# Patient Record
Sex: MALE | Race: WHITE | NOT HISPANIC OR LATINO | Employment: FULL TIME | URBAN - METROPOLITAN AREA
[De-identification: names, ages, dates, MRNs, and addresses within clinical notes are randomized per-mention and may not be internally consistent; named-entity substitution may affect disease eponyms.]

---

## 2017-03-09 DIAGNOSIS — M25.571 ACUTE RIGHT ANKLE PAIN: Primary | ICD-10-CM

## 2017-03-30 ENCOUNTER — HOSPITAL ENCOUNTER (OUTPATIENT)
Dept: RADIOLOGY | Facility: HOSPITAL | Age: 22
Discharge: HOME | End: 2017-03-30
Attending: ORTHOPAEDIC SURGERY
Payer: OTHER MISCELLANEOUS

## 2017-03-30 ENCOUNTER — OFFICE VISIT (OUTPATIENT)
Dept: ORTHOPEDICS | Facility: CLINIC | Age: 22
End: 2017-03-30
Payer: OTHER MISCELLANEOUS

## 2017-03-30 VITALS — BODY MASS INDEX: 25.76 KG/M2 | HEIGHT: 62 IN | WEIGHT: 140 LBS

## 2017-03-30 DIAGNOSIS — M25.571 ACUTE RIGHT ANKLE PAIN: ICD-10-CM

## 2017-03-30 DIAGNOSIS — M19.079 ARTHRITIS OF ANKLE: Primary | ICD-10-CM

## 2017-03-30 PROCEDURE — 99213 OFFICE O/P EST LOW 20 MIN: CPT | Mod: 25,S$GLB,, | Performed by: ORTHOPAEDIC SURGERY

## 2017-03-30 PROCEDURE — 99999 PR PBB SHADOW E&M-EST. PATIENT-LVL II: CPT | Mod: PBBFAC,,, | Performed by: ORTHOPAEDIC SURGERY

## 2017-03-30 PROCEDURE — 73610 X-RAY EXAM OF ANKLE: CPT | Mod: TC,PN,RT

## 2017-03-30 PROCEDURE — 73610 X-RAY EXAM OF ANKLE: CPT | Mod: 26,RT,, | Performed by: RADIOLOGY

## 2017-03-30 PROCEDURE — 20605 DRAIN/INJ JOINT/BURSA W/O US: CPT | Mod: RT,S$GLB,, | Performed by: ORTHOPAEDIC SURGERY

## 2017-03-30 RX ORDER — TRIAMCINOLONE ACETONIDE 40 MG/ML
40 INJECTION, SUSPENSION INTRA-ARTICULAR; INTRAMUSCULAR
Status: DISCONTINUED | OUTPATIENT
Start: 2017-03-30 | End: 2017-03-30 | Stop reason: HOSPADM

## 2017-03-30 RX ADMIN — TRIAMCINOLONE ACETONIDE 40 MG: 40 INJECTION, SUSPENSION INTRA-ARTICULAR; INTRAMUSCULAR at 05:03

## 2017-03-30 NOTE — PROGRESS NOTES
Subjective:      Patient ID: Kalin Sidhu IV is a 21 y.o. male who is here for f/u on his right ankle fracture. He was last injected in June. He rates his pain 3/10 today. He would like to repeat injection. THis will be his third injection. The last one lasted about 4 months.     Chief Complaint: Pain of the Right Ankle (Last injected 9-27-16 )    HPI  Social History     Occupational History    Not on file.     Social History Main Topics    Smoking status: Never Smoker    Smokeless tobacco: Current User      Comment: Dip tabacco    Alcohol use No      Comment: rarely    Drug use: No    Sexual activity: Not on file      ROS    neg    Objective:    Ortho Exam     RLE: No  swelling. Mildly decreased range of motion of the ankle. tenderness to palpation at the medial and lateral ankle joint. No swelling.     XRAY:  3 views of the right ankle reviewed today show good alignment, Hardware is intact. Healed bimalleolar ankle fracture. Mild joint space narrowing.     Assessment:          Encounter Diagnosis   Name Primary?    Arthritis of ankle Yes            Plan:     I injected the right ankle today. I told him he has some early post-traumatic arthritis. I can repeat injections every 4-6 months or prn as needed. I told him I would only repeat it maybe 2 more times. After that we could do ankle arthroscopy or possibly ankle fusion.

## 2017-03-30 NOTE — PROCEDURES
Intermediate Joint Aspiration/Injection  Date/Time: 3/30/2017 5:01 PM  Performed by: ADITYA JARA  Authorized by: ADITYA JARA     Consent Done?:  Yes (Verbal)  Indications:  Pain and joint swelling  Site marked: The procedure site was marked      Location:  Ankle  Site:  R ankle  Prep: Patient was prepped and draped in usual sterile fashion    Ultrasonic Guidance for needle placement: No  Needle size:  22 G  Approach:  Anteromedial  Medications:  40 mg triamcinolone acetonide 40 mg/mL  Patient tolerance:  Patient tolerated the procedure well with no immediate complications

## 2018-09-25 ENCOUNTER — HOSPITAL ENCOUNTER (OUTPATIENT)
Dept: RADIOLOGY | Facility: HOSPITAL | Age: 23
Discharge: HOME | End: 2018-09-25
Attending: ORTHOPAEDIC SURGERY
Payer: COMMERCIAL

## 2018-09-25 ENCOUNTER — OFFICE VISIT (OUTPATIENT)
Dept: ORTHOPEDICS | Facility: CLINIC | Age: 23
End: 2018-09-25
Payer: COMMERCIAL

## 2018-09-25 VITALS
HEART RATE: 88 BPM | SYSTOLIC BLOOD PRESSURE: 155 MMHG | HEIGHT: 62 IN | DIASTOLIC BLOOD PRESSURE: 99 MMHG | BODY MASS INDEX: 25.76 KG/M2 | WEIGHT: 140 LBS

## 2018-09-25 DIAGNOSIS — M25.571 RIGHT ANKLE PAIN, UNSPECIFIED CHRONICITY: Primary | ICD-10-CM

## 2018-09-25 DIAGNOSIS — M19.071 ARTHRITIS OF ANKLE, RIGHT: Primary | ICD-10-CM

## 2018-09-25 DIAGNOSIS — M25.571 RIGHT ANKLE PAIN, UNSPECIFIED CHRONICITY: ICD-10-CM

## 2018-09-25 PROCEDURE — 73610 X-RAY EXAM OF ANKLE: CPT | Mod: TC,PO,RT

## 2018-09-25 PROCEDURE — 99214 OFFICE O/P EST MOD 30 MIN: CPT | Mod: 25,S$GLB,, | Performed by: ORTHOPAEDIC SURGERY

## 2018-09-25 PROCEDURE — 20605 DRAIN/INJ JOINT/BURSA W/O US: CPT | Mod: RT,S$GLB,, | Performed by: ORTHOPAEDIC SURGERY

## 2018-09-25 PROCEDURE — 3008F BODY MASS INDEX DOCD: CPT | Mod: CPTII,S$GLB,, | Performed by: ORTHOPAEDIC SURGERY

## 2018-09-25 PROCEDURE — 73610 X-RAY EXAM OF ANKLE: CPT | Mod: 26,RT,, | Performed by: RADIOLOGY

## 2018-09-25 PROCEDURE — 99999 PR PBB SHADOW E&M-EST. PATIENT-LVL III: CPT | Mod: PBBFAC,,, | Performed by: ORTHOPAEDIC SURGERY

## 2018-09-25 RX ORDER — TRIAMCINOLONE ACETONIDE 40 MG/ML
40 INJECTION, SUSPENSION INTRA-ARTICULAR; INTRAMUSCULAR
Status: DISCONTINUED | OUTPATIENT
Start: 2018-09-25 | End: 2018-09-25 | Stop reason: HOSPADM

## 2018-09-25 RX ADMIN — TRIAMCINOLONE ACETONIDE 40 MG: 40 INJECTION, SUSPENSION INTRA-ARTICULAR; INTRAMUSCULAR at 03:09

## 2018-09-25 NOTE — PROCEDURES
Intermediate Joint Aspiration/Injection: R ankle  Date/Time: 9/25/2018 3:08 PM  Performed by: Graeme Burch MD  Authorized by: Graeme Burch MD     Consent Done?: Yes (Verbal)  Indications: Pain and joint swelling  Site marked: The procedure site was marked      Location:  Ankle  Site:  R ankle  Prep: Patient was prepped and draped in usual sterile fashion    Needle size:  22 G  Approach:  Anteromedial  Medications:  40 mg triamcinolone acetonide 40 mg/mL  Patient tolerance:  Patient tolerated the procedure well with no immediate complications

## 2018-09-25 NOTE — PROGRESS NOTES
Subjective:      Patient ID: Kalin Sidhu IV is a 23 y.o. male who is here for f/u on his right ankle fracture. He was last injected in March 2017. He rates his pain 3/10 today. He would like to repeat injection. THis will be his fourth injection. He says it has been doing very well up until about 2 weeks ago.     Chief Complaint: Pain of the Right Ankle    HPI  Social History     Occupational History    Not on file   Tobacco Use    Smoking status: Never Smoker    Smokeless tobacco: Current User    Tobacco comment: Dip tabacco   Substance and Sexual Activity    Alcohol use: No     Comment: rarely    Drug use: No    Sexual activity: Not on file      ROS    neg    Objective:    Ortho Exam     RLE:  Mildly decreased range of motion of the ankle. tenderness to palpation at the medial and lateral ankle joint. Minimal swelling.     XRAY:  3 views of the right ankle reviewed today show good alignment, Hardware is intact. Healed bimalleolar ankle fracture. Mild joint space narrowing.     Assessment:          Encounter Diagnosis   Name Primary?    Arthritis of ankle, right             Plan:     I injected the right ankle today. I told him he has some early post-traumatic arthritis. I can repeat injections every 4-6 months or prn as needed. I told him I would only repeat it maybe 1-2 more times. After that we could do ankle arthroscopy or possibly ankle fusion.

## 2018-09-26 ENCOUNTER — TELEPHONE (OUTPATIENT)
Dept: ORTHOPEDICS | Facility: CLINIC | Age: 23
End: 2018-09-26

## 2018-09-26 NOTE — TELEPHONE ENCOUNTER
----- Message from Joy Wray sent at 9/26/2018 11:45 AM CDT -----  Contact: Self  Patient left a voice mail message today at 11:30 that he needs to speak with a nurse about the injection he had yesterday.  Please call.

## 2018-11-26 ENCOUNTER — CLINICAL SUPPORT (OUTPATIENT)
Dept: INTERNAL MEDICINE | Facility: CLINIC | Age: 23
End: 2018-11-26

## 2018-11-26 DIAGNOSIS — Z02.1 PRE-EMPLOYMENT HEALTH SCREENING EXAMINATION: Primary | ICD-10-CM

## 2018-11-26 PROCEDURE — 92551 PURE TONE HEARING TEST AIR: CPT | Mod: ,,, | Performed by: NURSE PRACTITIONER

## 2018-11-26 PROCEDURE — 80305 DRUG TEST PRSMV DIR OPT OBS: CPT | Mod: ,,, | Performed by: NURSE PRACTITIONER

## 2018-12-17 ENCOUNTER — CLINICAL SUPPORT (OUTPATIENT)
Dept: INTERNAL MEDICINE | Facility: CLINIC | Age: 23
End: 2018-12-17

## 2018-12-17 VITALS
BODY MASS INDEX: 29.44 KG/M2 | DIASTOLIC BLOOD PRESSURE: 81 MMHG | RESPIRATION RATE: 20 BRPM | SYSTOLIC BLOOD PRESSURE: 124 MMHG | HEART RATE: 79 BPM | HEIGHT: 62 IN | WEIGHT: 160 LBS | OXYGEN SATURATION: 99 %

## 2018-12-17 DIAGNOSIS — Z02.89 VISIT FOR OCCUPATIONAL HEALTH EXAMINATION: Primary | ICD-10-CM

## 2018-12-17 PROCEDURE — 99499 UNLISTED E&M SERVICE: CPT | Mod: ,,, | Performed by: NURSE PRACTITIONER

## 2019-04-08 ENCOUNTER — CLINICAL SUPPORT (OUTPATIENT)
Dept: URGENT CARE | Facility: CLINIC | Age: 24
End: 2019-04-08
Payer: COMMERCIAL

## 2019-04-08 VITALS
DIASTOLIC BLOOD PRESSURE: 90 MMHG | BODY MASS INDEX: 26.68 KG/M2 | OXYGEN SATURATION: 98 % | HEART RATE: 83 BPM | RESPIRATION RATE: 18 BRPM | TEMPERATURE: 97 F | SYSTOLIC BLOOD PRESSURE: 132 MMHG | HEIGHT: 62 IN | WEIGHT: 145 LBS

## 2019-04-08 DIAGNOSIS — R05.9 COUGH: ICD-10-CM

## 2019-04-08 DIAGNOSIS — J01.90 ACUTE NON-RECURRENT SINUSITIS, UNSPECIFIED LOCATION: Primary | ICD-10-CM

## 2019-04-08 DIAGNOSIS — R51.9 SINUS HEADACHE: ICD-10-CM

## 2019-04-08 PROCEDURE — 96372 PR INJECTION,THERAP/PROPH/DIAG2ST, IM OR SUBCUT: ICD-10-PCS | Mod: S$GLB,,, | Performed by: NURSE PRACTITIONER

## 2019-04-08 PROCEDURE — 99204 PR OFFICE/OUTPT VISIT, NEW, LEVL IV, 45-59 MIN: ICD-10-PCS | Mod: 25,S$GLB,, | Performed by: NURSE PRACTITIONER

## 2019-04-08 PROCEDURE — 96372 THER/PROPH/DIAG INJ SC/IM: CPT | Mod: S$GLB,,, | Performed by: NURSE PRACTITIONER

## 2019-04-08 PROCEDURE — 99204 OFFICE O/P NEW MOD 45 MIN: CPT | Mod: 25,S$GLB,, | Performed by: NURSE PRACTITIONER

## 2019-04-08 RX ORDER — AMOXICILLIN AND CLAVULANATE POTASSIUM 875; 125 MG/1; MG/1
1 TABLET, FILM COATED ORAL EVERY 12 HOURS
Qty: 20 TABLET | Refills: 0 | Status: SHIPPED | OUTPATIENT
Start: 2019-04-08 | End: 2019-04-18

## 2019-04-08 RX ORDER — CODEINE PHOSPHATE AND GUAIFENESIN 10; 100 MG/5ML; MG/5ML
5 SOLUTION ORAL 3 TIMES DAILY PRN
Qty: 150 ML | Refills: 0 | Status: SHIPPED | OUTPATIENT
Start: 2019-04-08 | End: 2019-04-18

## 2019-04-08 RX ORDER — DEXAMETHASONE SODIUM PHOSPHATE 4 MG/ML
4 INJECTION, SOLUTION INTRA-ARTICULAR; INTRALESIONAL; INTRAMUSCULAR; INTRAVENOUS; SOFT TISSUE
Status: COMPLETED | OUTPATIENT
Start: 2019-04-08 | End: 2019-04-08

## 2019-04-08 RX ADMIN — DEXAMETHASONE SODIUM PHOSPHATE 4 MG: 4 INJECTION, SOLUTION INTRA-ARTICULAR; INTRALESIONAL; INTRAMUSCULAR; INTRAVENOUS; SOFT TISSUE at 04:04

## 2019-04-08 NOTE — PATIENT INSTRUCTIONS

## 2019-04-08 NOTE — PROGRESS NOTES
"Subjective:       Patient ID: Kalin Sidhu IV is a 23 y.o. male.    Vitals:  height is 5' 2" (1.575 m) and weight is 65.8 kg (145 lb). His oral temperature is 97.2 °F (36.2 °C). His blood pressure is 132/90 (abnormal) and his pulse is 83. His respiration is 18 and oxygen saturation is 98%.     Chief Complaint: Sinus Problem    Mr. Sidhu presents today with complaints of sinus congestion since Saturday. It is associated with headache, sweats, nonproductive cough, and sore throat. He denies measured fever, N/V/D, or ear pain.    Sinus Problem   This is a new problem. The current episode started in the past 7 days. Associated symptoms include congestion, headaches, sinus pressure and a sore throat. Pertinent negatives include no chills, coughing, diaphoresis, ear pain or shortness of breath. (Runny nose) Treatments tried: dayquil, nyquil.       Constitution: Negative for chills, sweating, fatigue and fever.   HENT: Positive for congestion, sinus pressure and sore throat. Negative for ear pain, sinus pain and voice change.    Neck: Negative for painful lymph nodes.   Eyes: Negative for eye redness.   Respiratory: Negative for chest tightness, cough, sputum production, bloody sputum, COPD, shortness of breath, stridor, wheezing and asthma.    Gastrointestinal: Negative for nausea and vomiting.   Musculoskeletal: Negative for muscle ache.   Skin: Negative for rash.   Allergic/Immunologic: Negative for seasonal allergies and asthma.   Neurological: Positive for headaches.   Hematologic/Lymphatic: Negative for swollen lymph nodes.       Objective:      Physical Exam   Constitutional: He is oriented to person, place, and time. He appears well-developed and well-nourished. He is cooperative.  Non-toxic appearance. He does not appear ill. No distress.   HENT:   Head: Normocephalic and atraumatic.   Right Ear: Hearing, tympanic membrane, external ear and ear canal normal.   Left Ear: Hearing, tympanic membrane, " external ear and ear canal normal.   Nose: No mucosal edema, rhinorrhea or nasal deformity. No epistaxis. Right sinus exhibits no maxillary sinus tenderness and no frontal sinus tenderness. Left sinus exhibits no maxillary sinus tenderness and no frontal sinus tenderness.   Mouth/Throat: Uvula is midline, oropharynx is clear and moist and mucous membranes are normal. No trismus in the jaw. Normal dentition. No uvula swelling. No posterior oropharyngeal erythema.   bilat OME, nares erythematous   Eyes: Conjunctivae and lids are normal. No scleral icterus.   Sclera clear bilat   Neck: Trachea normal, full passive range of motion without pain and phonation normal. Neck supple.   Cardiovascular: Normal rate, regular rhythm, normal heart sounds, intact distal pulses and normal pulses.   Pulmonary/Chest: Effort normal and breath sounds normal. No respiratory distress.   Abdominal: Soft. Normal appearance and bowel sounds are normal. He exhibits no distension. There is no tenderness.   Musculoskeletal: Normal range of motion. He exhibits no edema or deformity.   Neurological: He is alert and oriented to person, place, and time. He exhibits normal muscle tone. Coordination normal.   Skin: Skin is warm, dry and intact. Capillary refill takes less than 2 seconds. He is not diaphoretic. No pallor.   Psychiatric: He has a normal mood and affect. His speech is normal and behavior is normal. Judgment and thought content normal. Cognition and memory are normal.   Nursing note and vitals reviewed.      Assessment:       1. Acute non-recurrent sinusitis, unspecified location    2. Cough    3. Sinus headache        Plan:         Acute non-recurrent sinusitis, unspecified location  -     amoxicillin-clavulanate 875-125mg (AUGMENTIN) 875-125 mg per tablet; Take 1 tablet by mouth every 12 (twelve) hours. for 10 days  Dispense: 20 tablet; Refill: 0  -     dexamethasone injection 4 mg    Cough  -     guaifenesin-codeine 100-10 mg/5 ml  (TUSSI-ORGANIDIN NR)  mg/5 mL syrup; Take 5 mLs by mouth 3 (three) times daily as needed for Cough.  Dispense: 150 mL; Refill: 0    Sinus headache       Instructions on abx is to take if symptoms do not relieve in the next 4-7 days

## 2020-10-25 ENCOUNTER — OFFICE VISIT (OUTPATIENT)
Dept: URGENT CARE | Facility: CLINIC | Age: 25
End: 2020-10-25
Payer: COMMERCIAL

## 2020-10-25 VITALS
RESPIRATION RATE: 16 BRPM | WEIGHT: 157 LBS | HEIGHT: 62 IN | TEMPERATURE: 98 F | HEART RATE: 105 BPM | DIASTOLIC BLOOD PRESSURE: 85 MMHG | BODY MASS INDEX: 28.89 KG/M2 | SYSTOLIC BLOOD PRESSURE: 143 MMHG | OXYGEN SATURATION: 97 %

## 2020-10-25 DIAGNOSIS — J32.9 VIRAL SINUSITIS: Primary | ICD-10-CM

## 2020-10-25 DIAGNOSIS — B97.89 VIRAL SINUSITIS: Primary | ICD-10-CM

## 2020-10-25 PROCEDURE — 99214 PR OFFICE/OUTPT VISIT, EST, LEVL IV, 30-39 MIN: ICD-10-PCS | Mod: 25,S$GLB,, | Performed by: NURSE PRACTITIONER

## 2020-10-25 PROCEDURE — 99214 OFFICE O/P EST MOD 30 MIN: CPT | Mod: 25,S$GLB,, | Performed by: NURSE PRACTITIONER

## 2020-10-25 PROCEDURE — 96372 THER/PROPH/DIAG INJ SC/IM: CPT | Mod: S$GLB,,, | Performed by: NURSE PRACTITIONER

## 2020-10-25 PROCEDURE — 96372 PR INJECTION,THERAP/PROPH/DIAG2ST, IM OR SUBCUT: ICD-10-PCS | Mod: S$GLB,,, | Performed by: NURSE PRACTITIONER

## 2020-10-25 RX ORDER — FLUTICASONE PROPIONATE 50 MCG
1 SPRAY, SUSPENSION (ML) NASAL DAILY
Qty: 9.9 ML | Refills: 0 | Status: SHIPPED | OUTPATIENT
Start: 2020-10-25 | End: 2021-01-25

## 2020-10-25 RX ORDER — PSEUDOEPHEDRINE HCL 30 MG
60 TABLET ORAL EVERY 6 HOURS PRN
Qty: 24 TABLET | Refills: 0 | Status: SHIPPED | OUTPATIENT
Start: 2020-10-25 | End: 2021-01-25

## 2020-10-25 RX ORDER — CETIRIZINE HYDROCHLORIDE 10 MG/1
10 TABLET ORAL DAILY
Qty: 30 TABLET | Refills: 0 | Status: SHIPPED | OUTPATIENT
Start: 2020-10-25 | End: 2021-01-25

## 2020-10-25 RX ORDER — DEXAMETHASONE SODIUM PHOSPHATE 4 MG/ML
8 INJECTION, SOLUTION INTRA-ARTICULAR; INTRALESIONAL; INTRAMUSCULAR; INTRAVENOUS; SOFT TISSUE
Status: COMPLETED | OUTPATIENT
Start: 2020-10-25 | End: 2020-10-25

## 2020-10-25 RX ORDER — GUAIFENESIN 1200 MG/1
1 TABLET, EXTENDED RELEASE ORAL EVERY 12 HOURS PRN
Qty: 24 TABLET | Refills: 0 | Status: SHIPPED | OUTPATIENT
Start: 2020-10-25 | End: 2020-11-04

## 2020-10-25 RX ADMIN — DEXAMETHASONE SODIUM PHOSPHATE 8 MG: 4 INJECTION, SOLUTION INTRA-ARTICULAR; INTRALESIONAL; INTRAMUSCULAR; INTRAVENOUS; SOFT TISSUE at 04:10

## 2020-10-25 NOTE — LETTER
October 26, 2020      [facility]  [address]  [facility] [address]  Phone: [phone]       Patient: Kalin Sidhu   YOB: 1995  Date of Visit: 10/25/2020    To Whom It May Concern:    Safia Sidhu  was at [facility] on 10/25/2020. He may return to work/school on 10/27/2020 with no restrictions. If you have any questions or concerns, or if I can be of further assistance, please do not hesitate to contact me.    Sincerely,    Audrey Boland MA

## 2020-10-25 NOTE — PATIENT INSTRUCTIONS

## 2020-10-25 NOTE — PROGRESS NOTES
"Subjective:       Patient ID: Kalin Sidhu IV is a 25 y.o. male.    Vitals:  height is 5' 2" (1.575 m) and weight is 71.2 kg (157 lb). His oral temperature is 97.8 °F (36.6 °C). His blood pressure is 143/85 (abnormal) and his pulse is 105. His respiration is 16 and oxygen saturation is 97%.     Chief Complaint: Cough    Kalin Sidhu is a 25 year old male presenting to the clinic with c/o sinus pressure, nasal congestion, post nasal drip, dry cough, chest congestion, runny nose. He has had no fever, body aches, chills, chest pain, SOB, loss of smell/taste. He has taken no medication for his symptoms.     Cough  This is a new problem. The current episode started yesterday. The problem has been unchanged. The cough is productive of sputum. Associated symptoms include headaches, nasal congestion, postnasal drip and rhinorrhea. Pertinent negatives include no chest pain, chills, fever, myalgias, rash, sore throat or shortness of breath. He has tried OTC cough suppressant for the symptoms. The treatment provided mild relief.       Constitution: Negative for chills, fatigue and fever.   HENT: Positive for congestion and postnasal drip. Negative for sore throat.    Neck: Negative for painful lymph nodes.   Cardiovascular: Negative for chest pain and leg swelling.   Eyes: Negative for double vision and blurred vision.   Respiratory: Positive for cough. Negative for shortness of breath.    Gastrointestinal: Negative for nausea, vomiting and diarrhea.   Genitourinary: Negative for dysuria, frequency and urgency.   Musculoskeletal: Negative for joint pain, joint swelling, muscle cramps and muscle ache.   Skin: Negative for color change, pale and rash.   Allergic/Immunologic: Negative for seasonal allergies.   Neurological: Positive for headaches. Negative for dizziness, history of vertigo, light-headedness and passing out.   Hematologic/Lymphatic: Negative for swollen lymph nodes, easy bruising/bleeding and " history of blood clots. Does not bruise/bleed easily.   Psychiatric/Behavioral: Negative for nervous/anxious, sleep disturbance and depression. The patient is not nervous/anxious.        Objective:      Physical Exam   Constitutional: He is oriented to person, place, and time. He appears well-developed. He is cooperative.  Non-toxic appearance. He does not appear ill. No distress.   HENT:   Head: Normocephalic and atraumatic.   Ears:   Right Ear: Hearing, tympanic membrane, external ear and ear canal normal.   Left Ear: Hearing, tympanic membrane, external ear and ear canal normal.   Nose: Nose normal. No mucosal edema, rhinorrhea or nasal deformity. No epistaxis. Right sinus exhibits no maxillary sinus tenderness and no frontal sinus tenderness. Left sinus exhibits no maxillary sinus tenderness and no frontal sinus tenderness.   Mouth/Throat: Uvula is midline, oropharynx is clear and moist and mucous membranes are normal. No trismus in the jaw. Normal dentition. No uvula swelling. No posterior oropharyngeal erythema.   Eyes: Conjunctivae and lids are normal. Right eye exhibits no discharge. Left eye exhibits no discharge. No scleral icterus.   Neck: Trachea normal, normal range of motion, full passive range of motion without pain and phonation normal. Neck supple.   Cardiovascular: Normal rate, regular rhythm, normal heart sounds and normal pulses.   Pulmonary/Chest: Effort normal and breath sounds normal. No respiratory distress.   Abdominal: Soft. Normal appearance and bowel sounds are normal. He exhibits no distension, no pulsatile midline mass and no mass. There is no abdominal tenderness.   Musculoskeletal: Normal range of motion.         General: No deformity.   Neurological: He is alert and oriented to person, place, and time. He exhibits normal muscle tone. Coordination normal.   Skin: Skin is warm, dry, intact, not diaphoretic and not pale. Psychiatric: His speech is normal and behavior is normal. Judgment  and thought content normal.   Nursing note and vitals reviewed.        Assessment:       1. Viral sinusitis        Plan:         The patient appears to have a viral upper respiratory infection.  Based upon the history and physical exam the patient does not appear to have a serious bacterial infection such as pneumonia, sepsis, otitis media, bacterial sinusitis, strep pharyngitis, parapharyngeal or peritonsillar abscess, meningitis.  Patient appears very well and I have given specific return precautions to the patient and/or family members.  The patient can take over the counter medications and does not appear to need antibiotics at this time.       Viral sinusitis    Other orders  -     dexamethasone injection 8 mg  -     pseudoephedrine (SUDAFED) 30 MG tablet; Take 2 tablets (60 mg total) by mouth every 6 (six) hours as needed for Congestion.  Dispense: 24 tablet; Refill: 0  -     fluticasone propionate (FLONASE) 50 mcg/actuation nasal spray; 1 spray (50 mcg total) by Each Nostril route once daily.  Dispense: 9.9 mL; Refill: 0  -     cetirizine (ZYRTEC) 10 MG tablet; Take 1 tablet (10 mg total) by mouth once daily.  Dispense: 30 tablet; Refill: 0  -     guaiFENesin (MUCINEX) 1,200 mg Ta12; Take 1 tablet by mouth every 12 (twelve) hours as needed (congestion).  Dispense: 24 tablet; Refill: 0

## 2021-01-22 ENCOUNTER — OFFICE VISIT (OUTPATIENT)
Dept: PODIATRY | Facility: CLINIC | Age: 26
End: 2021-01-22
Payer: COMMERCIAL

## 2021-01-22 VITALS
HEART RATE: 97 BPM | DIASTOLIC BLOOD PRESSURE: 85 MMHG | TEMPERATURE: 99 F | WEIGHT: 158.63 LBS | HEIGHT: 62 IN | BODY MASS INDEX: 29.19 KG/M2 | SYSTOLIC BLOOD PRESSURE: 142 MMHG | OXYGEN SATURATION: 99 % | RESPIRATION RATE: 15 BRPM

## 2021-01-22 DIAGNOSIS — G57.51 TARSAL TUNNEL SYNDROME, RIGHT: ICD-10-CM

## 2021-01-22 DIAGNOSIS — G57.91 NEURITIS OF RIGHT FOOT: ICD-10-CM

## 2021-01-22 DIAGNOSIS — M76.821 POSTERIOR TIBIAL TENDINITIS, RIGHT: Primary | ICD-10-CM

## 2021-01-22 PROCEDURE — 1125F AMNT PAIN NOTED PAIN PRSNT: CPT | Mod: S$GLB,,, | Performed by: PODIATRIST

## 2021-01-22 PROCEDURE — 99999 PR PBB SHADOW E&M-EST. PATIENT-LVL III: ICD-10-PCS | Mod: PBBFAC,,, | Performed by: PODIATRIST

## 2021-01-22 PROCEDURE — 99999 PR PBB SHADOW E&M-EST. PATIENT-LVL III: CPT | Mod: PBBFAC,,, | Performed by: PODIATRIST

## 2021-01-22 PROCEDURE — 3008F PR BODY MASS INDEX (BMI) DOCUMENTED: ICD-10-PCS | Mod: CPTII,S$GLB,, | Performed by: PODIATRIST

## 2021-01-22 PROCEDURE — 99203 OFFICE O/P NEW LOW 30 MIN: CPT | Mod: S$GLB,,, | Performed by: PODIATRIST

## 2021-01-22 PROCEDURE — 99203 PR OFFICE/OUTPT VISIT, NEW, LEVL III, 30-44 MIN: ICD-10-PCS | Mod: S$GLB,,, | Performed by: PODIATRIST

## 2021-01-22 PROCEDURE — 3008F BODY MASS INDEX DOCD: CPT | Mod: CPTII,S$GLB,, | Performed by: PODIATRIST

## 2021-01-22 PROCEDURE — 1125F PR PAIN SEVERITY QUANTIFIED, PAIN PRESENT: ICD-10-PCS | Mod: S$GLB,,, | Performed by: PODIATRIST

## 2021-01-22 RX ORDER — DICLOFENAC SODIUM 75 MG/1
75 TABLET, DELAYED RELEASE ORAL 2 TIMES DAILY
Qty: 60 TABLET | Refills: 0 | Status: SHIPPED | OUTPATIENT
Start: 2021-01-22 | End: 2021-02-21

## 2021-03-22 DIAGNOSIS — M19.071 ARTHRITIS OF ANKLE, RIGHT: Primary | ICD-10-CM

## 2021-03-25 ENCOUNTER — OFFICE VISIT (OUTPATIENT)
Dept: ORTHOPEDICS | Facility: CLINIC | Age: 26
End: 2021-03-25
Payer: COMMERCIAL

## 2021-03-25 ENCOUNTER — HOSPITAL ENCOUNTER (OUTPATIENT)
Dept: RADIOLOGY | Facility: HOSPITAL | Age: 26
Discharge: HOME | End: 2021-03-25
Attending: ORTHOPAEDIC SURGERY
Payer: COMMERCIAL

## 2021-03-25 VITALS
HEART RATE: 89 BPM | DIASTOLIC BLOOD PRESSURE: 90 MMHG | HEIGHT: 62 IN | BODY MASS INDEX: 29.44 KG/M2 | SYSTOLIC BLOOD PRESSURE: 133 MMHG | WEIGHT: 160 LBS

## 2021-03-25 DIAGNOSIS — M19.071 ARTHRITIS OF ANKLE, RIGHT: ICD-10-CM

## 2021-03-25 DIAGNOSIS — M19.071 ARTHRITIS OF ANKLE, RIGHT: Primary | ICD-10-CM

## 2021-03-25 PROCEDURE — 1126F PR PAIN SEVERITY QUANTIFIED, NO PAIN PRESENT: ICD-10-PCS | Mod: S$GLB,,, | Performed by: ORTHOPAEDIC SURGERY

## 2021-03-25 PROCEDURE — 3008F PR BODY MASS INDEX (BMI) DOCUMENTED: ICD-10-PCS | Mod: CPTII,S$GLB,, | Performed by: ORTHOPAEDIC SURGERY

## 2021-03-25 PROCEDURE — 20605 DRAIN/INJ JOINT/BURSA W/O US: CPT | Mod: RT,S$GLB,, | Performed by: ORTHOPAEDIC SURGERY

## 2021-03-25 PROCEDURE — 1126F AMNT PAIN NOTED NONE PRSNT: CPT | Mod: S$GLB,,, | Performed by: ORTHOPAEDIC SURGERY

## 2021-03-25 PROCEDURE — 99999 PR PBB SHADOW E&M-EST. PATIENT-LVL III: ICD-10-PCS | Mod: PBBFAC,,, | Performed by: ORTHOPAEDIC SURGERY

## 2021-03-25 PROCEDURE — 20605 INTERMEDIATE JOINT ASPIRATION/INJECTION: R ANKLE: ICD-10-PCS | Mod: RT,S$GLB,, | Performed by: ORTHOPAEDIC SURGERY

## 2021-03-25 PROCEDURE — 73610 X-RAY EXAM OF ANKLE: CPT | Mod: 26,RT,, | Performed by: RADIOLOGY

## 2021-03-25 PROCEDURE — 99999 PR PBB SHADOW E&M-EST. PATIENT-LVL III: CPT | Mod: PBBFAC,,, | Performed by: ORTHOPAEDIC SURGERY

## 2021-03-25 PROCEDURE — 3008F BODY MASS INDEX DOCD: CPT | Mod: CPTII,S$GLB,, | Performed by: ORTHOPAEDIC SURGERY

## 2021-03-25 PROCEDURE — 99214 PR OFFICE/OUTPT VISIT, EST, LEVL IV, 30-39 MIN: ICD-10-PCS | Mod: 25,S$GLB,, | Performed by: ORTHOPAEDIC SURGERY

## 2021-03-25 PROCEDURE — 99214 OFFICE O/P EST MOD 30 MIN: CPT | Mod: 25,S$GLB,, | Performed by: ORTHOPAEDIC SURGERY

## 2021-03-25 PROCEDURE — 73610 X-RAY EXAM OF ANKLE: CPT | Mod: TC,PO,RT

## 2021-03-25 PROCEDURE — 73610 XR ANKLE COMPLETE 3 VIEW RIGHT: ICD-10-PCS | Mod: 26,RT,, | Performed by: RADIOLOGY

## 2021-03-25 RX ORDER — TRIAMCINOLONE ACETONIDE 40 MG/ML
20 INJECTION, SUSPENSION INTRA-ARTICULAR; INTRAMUSCULAR
Status: DISCONTINUED | OUTPATIENT
Start: 2021-03-25 | End: 2021-03-25 | Stop reason: HOSPADM

## 2021-03-25 RX ADMIN — TRIAMCINOLONE ACETONIDE 20 MG: 40 INJECTION, SUSPENSION INTRA-ARTICULAR; INTRAMUSCULAR at 01:03

## 2021-08-12 ENCOUNTER — IMMUNIZATION (OUTPATIENT)
Dept: PRIMARY CARE CLINIC | Facility: CLINIC | Age: 26
End: 2021-08-12
Payer: COMMERCIAL

## 2021-08-12 DIAGNOSIS — Z23 NEED FOR VACCINATION: Primary | ICD-10-CM

## 2021-08-12 PROCEDURE — 0001A COVID-19, MRNA, LNP-S, PF, 30 MCG/0.3 ML DOSE VACCINE: ICD-10-PCS | Mod: CV19,S$GLB,, | Performed by: FAMILY MEDICINE

## 2021-08-12 PROCEDURE — 91300 COVID-19, MRNA, LNP-S, PF, 30 MCG/0.3 ML DOSE VACCINE: ICD-10-PCS | Mod: S$GLB,,, | Performed by: FAMILY MEDICINE

## 2021-08-12 PROCEDURE — 91300 COVID-19, MRNA, LNP-S, PF, 30 MCG/0.3 ML DOSE VACCINE: CPT | Mod: S$GLB,,, | Performed by: FAMILY MEDICINE

## 2021-08-12 PROCEDURE — 0001A COVID-19, MRNA, LNP-S, PF, 30 MCG/0.3 ML DOSE VACCINE: CPT | Mod: CV19,S$GLB,, | Performed by: FAMILY MEDICINE

## 2021-09-02 ENCOUNTER — IMMUNIZATION (OUTPATIENT)
Dept: PRIMARY CARE CLINIC | Facility: CLINIC | Age: 26
End: 2021-09-02
Payer: COMMERCIAL

## 2021-09-02 DIAGNOSIS — Z23 NEED FOR VACCINATION: Primary | ICD-10-CM

## 2021-09-02 PROCEDURE — 0002A COVID-19, MRNA, LNP-S, PF, 30 MCG/0.3 ML DOSE VACCINE: CPT | Mod: CV19,S$GLB,, | Performed by: FAMILY MEDICINE

## 2021-09-02 PROCEDURE — 0002A COVID-19, MRNA, LNP-S, PF, 30 MCG/0.3 ML DOSE VACCINE: ICD-10-PCS | Mod: CV19,S$GLB,, | Performed by: FAMILY MEDICINE

## 2021-09-02 PROCEDURE — 91300 COVID-19, MRNA, LNP-S, PF, 30 MCG/0.3 ML DOSE VACCINE: ICD-10-PCS | Mod: S$GLB,,, | Performed by: FAMILY MEDICINE

## 2021-09-02 PROCEDURE — 91300 COVID-19, MRNA, LNP-S, PF, 30 MCG/0.3 ML DOSE VACCINE: CPT | Mod: S$GLB,,, | Performed by: FAMILY MEDICINE

## 2022-03-16 ENCOUNTER — TELEPHONE (OUTPATIENT)
Dept: FAMILY MEDICINE | Facility: CLINIC | Age: 27
End: 2022-03-16
Payer: COMMERCIAL

## 2022-03-16 ENCOUNTER — PATIENT MESSAGE (OUTPATIENT)
Dept: SPORTS MEDICINE | Facility: CLINIC | Age: 27
End: 2022-03-16

## 2022-03-16 ENCOUNTER — OFFICE VISIT (OUTPATIENT)
Dept: SPORTS MEDICINE | Facility: CLINIC | Age: 27
End: 2022-03-16
Payer: COMMERCIAL

## 2022-03-16 ENCOUNTER — HOSPITAL ENCOUNTER (OUTPATIENT)
Dept: RADIOLOGY | Facility: CLINIC | Age: 27
Discharge: HOME | End: 2022-03-16
Attending: FAMILY MEDICINE
Payer: COMMERCIAL

## 2022-03-16 VITALS
OXYGEN SATURATION: 97 % | BODY MASS INDEX: 28.84 KG/M2 | DIASTOLIC BLOOD PRESSURE: 80 MMHG | HEIGHT: 62 IN | WEIGHT: 156.75 LBS | SYSTOLIC BLOOD PRESSURE: 124 MMHG | HEART RATE: 92 BPM

## 2022-03-16 DIAGNOSIS — S52.572A OTHER CLOSED INTRA-ARTICULAR FRACTURE OF DISTAL END OF LEFT RADIUS, INITIAL ENCOUNTER: Primary | ICD-10-CM

## 2022-03-16 DIAGNOSIS — S52.572A OTHER CLOSED INTRA-ARTICULAR FRACTURE OF DISTAL END OF LEFT RADIUS, INITIAL ENCOUNTER: ICD-10-CM

## 2022-03-16 DIAGNOSIS — S69.92XA INJURY OF LEFT WRIST, INITIAL ENCOUNTER: ICD-10-CM

## 2022-03-16 PROCEDURE — 99203 PR OFFICE/OUTPT VISIT, NEW, LEVL III, 30-44 MIN: ICD-10-PCS | Mod: 57,S$GLB,, | Performed by: FAMILY MEDICINE

## 2022-03-16 PROCEDURE — 25600 CLTX DST RDL FX/EPHYS SEP WO: CPT | Mod: LT,S$GLB,, | Performed by: FAMILY MEDICINE

## 2022-03-16 PROCEDURE — 73110 XR WRIST COMPLETE 3 VIEWS LEFT: ICD-10-PCS | Mod: 26,LT,S$GLB, | Performed by: RADIOLOGY

## 2022-03-16 PROCEDURE — 99999 PR PBB SHADOW E&M-EST. PATIENT-LVL III: CPT | Mod: PBBFAC,,, | Performed by: FAMILY MEDICINE

## 2022-03-16 PROCEDURE — 73110 X-RAY EXAM OF WRIST: CPT | Mod: 26,LT,S$GLB, | Performed by: RADIOLOGY

## 2022-03-16 PROCEDURE — 99999 PR PBB SHADOW E&M-EST. PATIENT-LVL III: ICD-10-PCS | Mod: PBBFAC,,, | Performed by: FAMILY MEDICINE

## 2022-03-16 PROCEDURE — 99203 OFFICE O/P NEW LOW 30 MIN: CPT | Mod: 57,S$GLB,, | Performed by: FAMILY MEDICINE

## 2022-03-16 PROCEDURE — 25600 PR CLOSED RX DIST RAD/ULNA FX: ICD-10-PCS | Mod: LT,S$GLB,, | Performed by: FAMILY MEDICINE

## 2022-03-16 PROCEDURE — 73110 X-RAY EXAM OF WRIST: CPT | Mod: TC,FY,PO,LT

## 2022-03-16 NOTE — TELEPHONE ENCOUNTER
Spoke with pt via phone. Explained that provider has viewed X-Rays and saw small fracture. Provider recommends pt continue wearing brace for the next 2 weeks. Pt verbalized understanding. No further questions.

## 2022-03-16 NOTE — TELEPHONE ENCOUNTER
----- Message from Vaughn Conley sent at 3/16/2022  4:44 PM CDT -----  Contact: pt  Who Called: PT  Regarding: The pt is calling to speak with the provider in regards to his results stating he would like to know if he has a fracture or not. Please contact the pt.   Would the patient rather a call back or a response via [facility]? Call back  Best Call Back Number: [phone]  Additional Information:

## 2022-03-16 NOTE — LETTER
March 16, 2022      [facility]  [address] [place] [address]  Phone: [phone]       Patient: Kalin Sidhu   YOB: 1995  Date of Visit: 03/16/2022    To Whom It May Concern:    Safia Sidhu  was at [facility] on 03/16/2022. He has a fracture of his Left wrist. The patient may return to work/school on 03/17/22 with restrictions. Can not lift anything or put pressure on his left wrist. Light duty or desk work if available.   If you have any questions or concerns, or if I can be of further assistance, please do not hesitate to contact me.    Sincerely,      Everardo Koehler MD

## 2022-03-16 NOTE — PROGRESS NOTES
Subjective:          Chief Complaint: Kalin Sidhu IV is a 26 y.o. male who had no chief complaint listed for this encounter.    26-year-old male here today for evaluation management of left wrist injury.  Suffered on March 9th while at work.  States that he fell onto an outstretched hand.  Initially thought he just sprained his wrist but pain did not improve so on March 11th he was seen at an urgent care in [place], [place].  X-rays were performed there and was told that he had a small fracture in his distal radius.  He was given a wrist brace and sent here for further management.    Today he reports minimal pain.  Some swelling remaining.      Review of Systems   Constitutional: Negative for chills and decreased appetite.   HENT: Negative for congestion and sore throat.    Eyes: Negative for blurred vision.   Cardiovascular: Negative for chest pain, dyspnea on exertion and palpitations.   Respiratory: Negative for cough and shortness of breath.    Skin: Negative for rash.   Neurological: Negative for difficulty with concentration, disturbances in coordination and headaches.   Psychiatric/Behavioral: Negative for altered mental status, depression, hallucinations, memory loss and suicidal ideas.                   Objective:        General: Kalin is well-developed, well-nourished, appears stated age, in no acute distress, alert and oriented to time, place and person.     General    Nursing note and vitals reviewed.  Constitutional: He is oriented to person, place, and time. He appears well-developed and well-nourished.   HENT:   Nose: Nose normal.   Eyes: EOM are normal. Pupils are equal, round, and reactive to light.   Neck: Neck supple.   Cardiovascular: Normal rate.    Pulmonary/Chest: Effort normal.   Abdominal: Soft.   Neurological: He is alert and oriented to person, place, and time. He has normal reflexes.   Psychiatric: He has a normal mood and affect. His behavior is normal. Judgment and  thought content normal.             Right Hand/Wrist Exam   Right hand exam is normal.      Left Hand/Wrist Exam     Inspection   Effusion: Wrist - present     Tenderness   The patient is tender to palpation of the radial area.     Range of Motion     Wrist   Extension: 30   Flexion: 70   Pronation: normal   Supination: normal     Tests     Atrophy  Thenar:  Negative  Hypothenar:  negative    Other     Sensory Exam  Median Distribution: normal  Ulnar Distribution: normal  Radial Distribution: normal          Muscle Strength   Left Upper Extremity  :  5/5     Vascular Exam       Capillary Refill  Left Hand: normal capillary refill      Xray images from  brought her on disk. Reviewed in clinic by me. The images are of poor quality. There is a possible lucency line in the intraarticular distal radius. This could represent the described fracture from urgent care.         Assessment:       Encounter Diagnoses   Name Primary?    Other closed intra-articular fracture of distal end of left radius, initial encounter Yes    Injury of left wrist, initial encounter           Plan:       Will treat injury as fracture despite unclear imaging from urgent care.  Will get new x-rays today.  Ordered short-arm Exos splint which would be more appropriate for this injury other than the soft brace he has.  Will have him follow-up in two weeks for recheck.  Gave him a work excuse for light duty and restrictions of avoiding lifting anything with his left hand.                    Patient questionnaires may have been collected.

## 2022-03-30 ENCOUNTER — HOSPITAL ENCOUNTER (OUTPATIENT)
Dept: RADIOLOGY | Facility: CLINIC | Age: 27
Discharge: HOME | End: 2022-03-30
Attending: FAMILY MEDICINE
Payer: COMMERCIAL

## 2022-03-30 ENCOUNTER — OFFICE VISIT (OUTPATIENT)
Dept: SPORTS MEDICINE | Facility: CLINIC | Age: 27
End: 2022-03-30
Payer: OTHER MISCELLANEOUS

## 2022-03-30 VITALS
BODY MASS INDEX: 29.3 KG/M2 | WEIGHT: 159.19 LBS | HEART RATE: 94 BPM | HEIGHT: 62 IN | OXYGEN SATURATION: 97 % | DIASTOLIC BLOOD PRESSURE: 80 MMHG | SYSTOLIC BLOOD PRESSURE: 134 MMHG

## 2022-03-30 DIAGNOSIS — S52.572D OTHER CLOSED INTRA-ARTICULAR FRACTURE OF DISTAL END OF LEFT RADIUS WITH ROUTINE HEALING, SUBSEQUENT ENCOUNTER: Primary | ICD-10-CM

## 2022-03-30 DIAGNOSIS — S52.572D OTHER CLOSED INTRA-ARTICULAR FRACTURE OF DISTAL END OF LEFT RADIUS WITH ROUTINE HEALING, SUBSEQUENT ENCOUNTER: ICD-10-CM

## 2022-03-30 PROCEDURE — 73110 X-RAY EXAM OF WRIST: CPT | Mod: TC,FY,PO,LT

## 2022-03-30 PROCEDURE — 99999 PR PBB SHADOW E&M-EST. PATIENT-LVL III: ICD-10-PCS | Mod: PBBFAC,,, | Performed by: FAMILY MEDICINE

## 2022-03-30 PROCEDURE — 99024 POSTOP FOLLOW-UP VISIT: CPT | Mod: S$GLB,,, | Performed by: FAMILY MEDICINE

## 2022-03-30 PROCEDURE — 73110 X-RAY EXAM OF WRIST: CPT | Mod: 26,LT,S$GLB, | Performed by: RADIOLOGY

## 2022-03-30 PROCEDURE — 99024 PR POST-OP FOLLOW-UP VISIT: ICD-10-PCS | Mod: S$GLB,,, | Performed by: FAMILY MEDICINE

## 2022-03-30 PROCEDURE — 73110 XR WRIST COMPLETE 3 VIEWS LEFT: ICD-10-PCS | Mod: 26,LT,S$GLB, | Performed by: RADIOLOGY

## 2022-03-30 PROCEDURE — 99999 PR PBB SHADOW E&M-EST. PATIENT-LVL III: CPT | Mod: PBBFAC,,, | Performed by: FAMILY MEDICINE

## 2022-03-30 NOTE — LETTER
March 30, 2022      [facility]  [address]  [place] [address]  Phone: [phone]       Patient: Kalin Sidhu   YOB: 1995  Date of Visit: 03/30/2022    To Whom It May Concern:    Safia Sidhu  was at [facility] on 03/30/2022. The patient may return to work/school on 03/30/2022 with restrictions. Has wrist fracture in L wrist and is not able to lift anything with left hand. Light duty work only. If you have any questions or concerns, or if I can be of further assistance, please do not hesitate to contact me.    Sincerely,    Everardo Koehler MD

## 2022-03-31 NOTE — PROGRESS NOTES
Subjective:          Chief Complaint: Kalin Sidhu IV is a 26 y.o. male who had no chief complaint listed for this encounter.    Returns today for fracture follow-up.  He is in an Exos splint states he has been compliant with wearing it.  Still has pain in his wrist.  Has been on light duty at work.      Review of Systems   Constitutional: Negative for chills and decreased appetite.   HENT: Negative for congestion and sore throat.    Eyes: Negative for blurred vision.   Cardiovascular: Negative for chest pain, dyspnea on exertion and palpitations.   Respiratory: Negative for cough and shortness of breath.    Skin: Negative for rash.   Neurological: Negative for difficulty with concentration, disturbances in coordination and headaches.   Psychiatric/Behavioral: Negative for altered mental status, depression, hallucinations, memory loss and suicidal ideas.                   Objective:        General: Kalin is well-developed, well-nourished, appears stated age, in no acute distress, alert and oriented to time, place and person.     General    Nursing note and vitals reviewed.  Constitutional: He is oriented to person, place, and time. He appears well-developed and well-nourished.   HENT:   Nose: Nose normal.   Eyes: EOM are normal. Pupils are equal, round, and reactive to light.   Neck: Neck supple.   Cardiovascular: Normal rate.    Pulmonary/Chest: Effort normal.   Abdominal: Soft.   Neurological: He is alert and oriented to person, place, and time. He has normal reflexes.   Psychiatric: He has a normal mood and affect. His behavior is normal. Judgment and thought content normal.             Right Hand/Wrist Exam   Right hand exam is normal.      Left Hand/Wrist Exam     Inspection   Effusion: Wrist - present     Tenderness   The patient is tender to palpation of the radial area.     Range of Motion     Wrist   Extension: 30   Flexion: 70   Pronation: normal   Supination: normal     Tests      Atrophy  Thenar:  Negative  Hypothenar:  negative    Other     Sensory Exam  Median Distribution: normal  Ulnar Distribution: normal  Radial Distribution: normal          Muscle Strength   Left Upper Extremity  :  5/5     Vascular Exam       Capillary Refill  Left Hand: normal capillary refill          X-ray images ordered obtained interpreted by me.  They show no change in alignment from fracture on previous x-ray with evidence of healing.  Nondisplaced and intra-articular.          Assessment:       Encounter Diagnosis   Name Primary?    Other closed intra-articular fracture of distal end of left radius with routine healing, subsequent encounter Yes          Plan:         Remain an Exos splint will follow-up with repeat x-rays in a few weeks.                  Patient questionnaires may have been collected.

## 2022-04-22 ENCOUNTER — HOSPITAL ENCOUNTER (OUTPATIENT)
Dept: RADIOLOGY | Facility: CLINIC | Age: 27
Discharge: HOME | End: 2022-04-22
Attending: FAMILY MEDICINE
Payer: COMMERCIAL

## 2022-04-22 ENCOUNTER — OFFICE VISIT (OUTPATIENT)
Dept: SPORTS MEDICINE | Facility: CLINIC | Age: 27
End: 2022-04-22
Payer: OTHER MISCELLANEOUS

## 2022-04-22 VITALS
WEIGHT: 155.63 LBS | OXYGEN SATURATION: 95 % | SYSTOLIC BLOOD PRESSURE: 130 MMHG | HEIGHT: 62 IN | HEART RATE: 89 BPM | BODY MASS INDEX: 28.64 KG/M2 | DIASTOLIC BLOOD PRESSURE: 78 MMHG

## 2022-04-22 DIAGNOSIS — S52.572D OTHER CLOSED INTRA-ARTICULAR FRACTURE OF DISTAL END OF LEFT RADIUS WITH ROUTINE HEALING, SUBSEQUENT ENCOUNTER: Primary | ICD-10-CM

## 2022-04-22 DIAGNOSIS — S69.92XD INJURY OF LEFT WRIST, SUBSEQUENT ENCOUNTER: ICD-10-CM

## 2022-04-22 DIAGNOSIS — S52.572D OTHER CLOSED INTRA-ARTICULAR FRACTURE OF DISTAL END OF LEFT RADIUS WITH ROUTINE HEALING, SUBSEQUENT ENCOUNTER: ICD-10-CM

## 2022-04-22 PROCEDURE — 73110 X-RAY EXAM OF WRIST: CPT | Mod: 26,LT,S$GLB, | Performed by: RADIOLOGY

## 2022-04-22 PROCEDURE — 73110 X-RAY EXAM OF WRIST: CPT | Mod: TC,FY,PO,LT

## 2022-04-22 PROCEDURE — 99999 PR PBB SHADOW E&M-EST. PATIENT-LVL III: CPT | Mod: PBBFAC,,, | Performed by: FAMILY MEDICINE

## 2022-04-22 PROCEDURE — 73110 XR WRIST COMPLETE 3 VIEWS LEFT: ICD-10-PCS | Mod: 26,LT,S$GLB, | Performed by: RADIOLOGY

## 2022-04-22 PROCEDURE — 99024 PR POST-OP FOLLOW-UP VISIT: ICD-10-PCS | Mod: S$GLB,,, | Performed by: FAMILY MEDICINE

## 2022-04-22 PROCEDURE — 99999 PR PBB SHADOW E&M-EST. PATIENT-LVL III: ICD-10-PCS | Mod: PBBFAC,,, | Performed by: FAMILY MEDICINE

## 2022-04-22 PROCEDURE — 99024 POSTOP FOLLOW-UP VISIT: CPT | Mod: S$GLB,,, | Performed by: FAMILY MEDICINE

## 2022-04-22 NOTE — PROGRESS NOTES
Subjective:          Chief Complaint: Kalin Sidhu IV is a 26 y.o. male who had no chief complaint listed for this encounter.    Fracture f/u distal radius.     Reports minimal pain with wrist but is sore.     Some stiffness from being in exos splint.      Review of Systems   Constitutional: Negative for chills and decreased appetite.   HENT: Negative for congestion and sore throat.    Eyes: Negative for blurred vision.   Cardiovascular: Negative for chest pain, dyspnea on exertion and palpitations.   Respiratory: Negative for cough and shortness of breath.    Skin: Negative for rash.   Neurological: Negative for difficulty with concentration, disturbances in coordination and headaches.   Psychiatric/Behavioral: Negative for altered mental status, depression, hallucinations, memory loss and suicidal ideas.                   Objective:        General: Kalin is well-developed, well-nourished, appears stated age, in no acute distress, alert and oriented to time, place and person.     General    Nursing note and vitals reviewed.  Constitutional: He is oriented to person, place, and time. He appears well-developed and well-nourished.   HENT:   Nose: Nose normal.   Eyes: EOM are normal. Pupils are equal, round, and reactive to light.   Neck: Neck supple.   Cardiovascular: Normal rate.    Pulmonary/Chest: Effort normal.   Abdominal: Soft.   Neurological: He is alert and oriented to person, place, and time. He has normal reflexes.   Psychiatric: He has a normal mood and affect. His behavior is normal. Judgment and thought content normal.             Right Hand/Wrist Exam   Right hand exam is normal.      Left Hand/Wrist Exam     Inspection   Effusion: Wrist - absent     Tenderness   The patient is tender to palpation of the radial area.     Range of Motion     Wrist   Extension: 40   Flexion: 80   Pronation: normal   Supination: normal     Tests     Atrophy  Thenar:  Negative  Hypothenar:  negative    Other      Sensory Exam  Median Distribution: normal  Ulnar Distribution: normal  Radial Distribution: normal    Comments:  Minimal tenderness          Muscle Strength   Left Upper Extremity  :  5/5     Vascular Exam       Capillary Refill  Left Hand: normal capillary refill          X-ray images ordered obtained interpreted by me.  They show good evidence of healing for distal radius fracture. Intra-articular component is no longer visualized. alignment and postioning are well maintained.               Assessment:       Encounter Diagnoses   Name Primary?    Other closed intra-articular fracture of distal end of left radius with routine healing, subsequent encounter Yes    Injury of left wrist, subsequent encounter           Plan:       Fracture appeals sufficiently healed.     He may use exos splint as needed for comfort measures. He may gradually return to normal activity over next 2 weeks.                     Patient questionnaires may have been collected.

## 2022-10-04 DIAGNOSIS — M25.571 RIGHT ANKLE PAIN, UNSPECIFIED CHRONICITY: Primary | ICD-10-CM

## 2022-10-05 ENCOUNTER — HOSPITAL ENCOUNTER (OUTPATIENT)
Dept: RADIOLOGY | Facility: HOSPITAL | Age: 27
Discharge: HOME | End: 2022-10-05
Attending: FAMILY MEDICINE
Payer: COMMERCIAL

## 2022-10-05 ENCOUNTER — OFFICE VISIT (OUTPATIENT)
Dept: ORTHOPEDICS | Facility: CLINIC | Age: 27
End: 2022-10-05
Payer: COMMERCIAL

## 2022-10-05 VITALS — HEIGHT: 62 IN | RESPIRATION RATE: 18 BRPM | WEIGHT: 155 LBS | BODY MASS INDEX: 28.52 KG/M2

## 2022-10-05 DIAGNOSIS — M25.571 RIGHT ANKLE PAIN, UNSPECIFIED CHRONICITY: ICD-10-CM

## 2022-10-05 DIAGNOSIS — M25.571 CHRONIC PAIN OF RIGHT ANKLE: ICD-10-CM

## 2022-10-05 DIAGNOSIS — M19.071 ARTHRITIS OF ANKLE, RIGHT: Primary | ICD-10-CM

## 2022-10-05 DIAGNOSIS — G89.29 CHRONIC PAIN OF RIGHT ANKLE: ICD-10-CM

## 2022-10-05 PROCEDURE — 99214 OFFICE O/P EST MOD 30 MIN: CPT | Mod: 25,S$GLB,, | Performed by: FAMILY MEDICINE

## 2022-10-05 PROCEDURE — 99999 PR PBB SHADOW E&M-EST. PATIENT-LVL III: ICD-10-PCS | Mod: PBBFAC,,, | Performed by: FAMILY MEDICINE

## 2022-10-05 PROCEDURE — 3008F BODY MASS INDEX DOCD: CPT | Mod: CPTII,S$GLB,, | Performed by: FAMILY MEDICINE

## 2022-10-05 PROCEDURE — 20606 INTERMEDIATE JOINT ASPIRATION/INJECTION: R ANKLE: ICD-10-PCS | Mod: RT,S$GLB,, | Performed by: FAMILY MEDICINE

## 2022-10-05 PROCEDURE — 73610 XR ANKLE COMPLETE 3 VIEW RIGHT: ICD-10-PCS | Mod: 26,RT,, | Performed by: RADIOLOGY

## 2022-10-05 PROCEDURE — 99999 PR PBB SHADOW E&M-EST. PATIENT-LVL III: CPT | Mod: PBBFAC,,, | Performed by: FAMILY MEDICINE

## 2022-10-05 PROCEDURE — 3008F PR BODY MASS INDEX (BMI) DOCUMENTED: ICD-10-PCS | Mod: CPTII,S$GLB,, | Performed by: FAMILY MEDICINE

## 2022-10-05 PROCEDURE — 73610 X-RAY EXAM OF ANKLE: CPT | Mod: 26,RT,, | Performed by: RADIOLOGY

## 2022-10-05 PROCEDURE — 20606 DRAIN/INJ JOINT/BURSA W/US: CPT | Mod: RT,S$GLB,, | Performed by: FAMILY MEDICINE

## 2022-10-05 PROCEDURE — 1159F MED LIST DOCD IN RCRD: CPT | Mod: CPTII,S$GLB,, | Performed by: FAMILY MEDICINE

## 2022-10-05 PROCEDURE — 73610 X-RAY EXAM OF ANKLE: CPT | Mod: TC,PN,RT

## 2022-10-05 PROCEDURE — 1159F PR MEDICATION LIST DOCUMENTED IN MEDICAL RECORD: ICD-10-PCS | Mod: CPTII,S$GLB,, | Performed by: FAMILY MEDICINE

## 2022-10-05 PROCEDURE — 99214 PR OFFICE/OUTPT VISIT, EST, LEVL IV, 30-39 MIN: ICD-10-PCS | Mod: 25,S$GLB,, | Performed by: FAMILY MEDICINE

## 2022-10-05 RX ORDER — METHYLPREDNISOLONE ACETATE 80 MG/ML
80 INJECTION, SUSPENSION INTRA-ARTICULAR; INTRALESIONAL; INTRAMUSCULAR; SOFT TISSUE
Status: DISCONTINUED | OUTPATIENT
Start: 2022-10-05 | End: 2022-10-05 | Stop reason: HOSPADM

## 2022-10-05 RX ADMIN — METHYLPREDNISOLONE ACETATE 80 MG: 80 INJECTION, SUSPENSION INTRA-ARTICULAR; INTRALESIONAL; INTRAMUSCULAR; SOFT TISSUE at 04:10

## 2022-10-06 NOTE — PROGRESS NOTES
Subjective:      Patient ID: Kalin Sidhu IV is a 27 y.o. male.    Chief Complaint: Pain of the Right Ankle    Patient here today with complaints of pain in his right ankle.  This is a chronic issue ever since he sustained a fracture to the distal fibula that was surgically repaired.  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  Dr. Burch was giving him intra-articular ankle injections which successfully with relieved his pain for long periods of time.  His last ankle injection was done over a year and a half ago.  He is requesting a repeat of that procedure today.    Pain  Pertinent negatives include no chest pain, chills, congestion, coughing, headaches, rash or sore throat.     Review of Systems   Constitutional: Negative for chills and decreased appetite.   HENT:  Negative for congestion and sore throat.    Eyes:  Negative for blurred vision.   Cardiovascular:  Negative for chest pain, dyspnea on exertion and palpitations.   Respiratory:  Negative for cough and shortness of breath.    Skin:  Negative for rash.   Neurological:  Negative for difficulty with concentration, disturbances in coordination and headaches.   Psychiatric/Behavioral:  Negative for altered mental status, depression, hallucinations, memory loss and suicidal ideas.        Objective:            General    Nursing note and vitals reviewed.  Constitutional: He is oriented to person, place, and time. He appears well-developed and well-nourished.   HENT:   Nose: Nose normal.   Eyes: EOM are normal. Pupils are equal, round, and reactive to light.   Neck: Neck supple.   Cardiovascular:  Normal rate.            Pulmonary/Chest: Effort normal.   Abdominal: Soft.   Neurological: He is alert and oriented to person, place, and time. He has normal reflexes.   Psychiatric: He has a normal mood and affect. His behavior is normal. Judgment and thought content normal.         Right Ankle/Foot Exam     Inspection   Bruising: Ankle -  absent   Effusion: Ankle - absent   Atrophy: Ankle - absent     Tenderness   The patient is tender to palpation of the lateral malleolus and lateral talar dome.    Pain   The patient exhibits pain of the lateral malleolus and lateral talar dome.    Range of Motion   The patient has normal right ankle ROM.    Muscle Strength   The patient has normal right ankle strength.    Tests   Anterior drawer: negative  Varus tilt: negative  Squeeze Test: negative    Other   Ankle Crepitus: present  Sensation: normal    Left Ankle/Foot Exam   Left ankle exam is normal.      Vascular Exam       Edema  Right Lower Leg: absent      X-ray images ordered obtained interpreted by me.  They show prior over if of the distal fibula.  Ossicles present at the medial and lateral malleolus respectively.  Slight degenerative changes of the ankle joint.  No acute fractures are present.        Assessment:       Encounter Diagnoses   Name Primary?    Arthritis of ankle, right Yes    Chronic pain of right ankle           Plan:       Kalin was seen today for pain.    Diagnoses and all orders for this visit:    Arthritis of ankle, right    Chronic pain of right ankle    Will proceed with ultrasound-guided steroid injection of the right ankle today.    Intermediate Joint Aspiration/Injection: R ankle    Date/Time: 10/5/2022 4:40 PM  Performed by: Everardo Koehler MD  Authorized by: Everardo Koehler MD     Consent Done?:  Yes (Verbal)  Indications:  Arthritis and pain  Site marked: The procedure site was marked    Timeout: Prior to procedure the correct patient, procedure, and site was verified      Location:  Ankle  Site:  R ankle  Prep: Patient was prepped and draped in usual sterile fashion    Ultrasonic Guidance for needle placement: Yes  Images are saved and documented.    Needle size:  22 G  Approach:  Dorsal  Medications:  80 mg methylPREDNISolone acetate 80 mg/mL  Patient tolerance:  Patient tolerated the procedure well with no immediate  complications       Additional Comments: Ultrasound guidance was used to guide needle placement.  Images of proper needle placement were saved and stored to the machine.

## 2023-05-07 ENCOUNTER — OFFICE VISIT (OUTPATIENT)
Dept: URGENT CARE | Facility: CLINIC | Age: 28
End: 2023-05-07
Payer: COMMERCIAL

## 2023-05-07 VITALS
HEIGHT: 62 IN | OXYGEN SATURATION: 97 % | HEART RATE: 94 BPM | DIASTOLIC BLOOD PRESSURE: 78 MMHG | WEIGHT: 155 LBS | SYSTOLIC BLOOD PRESSURE: 130 MMHG | BODY MASS INDEX: 28.52 KG/M2 | TEMPERATURE: 98 F

## 2023-05-07 DIAGNOSIS — R52 BODY ACHES: ICD-10-CM

## 2023-05-07 DIAGNOSIS — R09.81 HEAD CONGESTION: ICD-10-CM

## 2023-05-07 DIAGNOSIS — B34.9 ACUTE VIRAL SYNDROME: Primary | ICD-10-CM

## 2023-05-07 LAB
CTP QC/QA: YES
SARS-COV-2 AG RESP QL IA.RAPID: NEGATIVE

## 2023-05-07 PROCEDURE — 99203 OFFICE O/P NEW LOW 30 MIN: CPT | Mod: ,,, | Performed by: NURSE PRACTITIONER

## 2023-05-07 PROCEDURE — 99203 PR OFFICE/OUTPT VISIT, NEW, LEVL III, 30-44 MIN: ICD-10-PCS | Mod: ,,, | Performed by: NURSE PRACTITIONER

## 2023-05-07 PROCEDURE — 87811 SARS CORONAVIRUS 2 ANTIGEN POCT, MANUAL READ: ICD-10-PCS | Mod: QW,,, | Performed by: NURSE PRACTITIONER

## 2023-05-07 PROCEDURE — 87811 SARS-COV-2 COVID19 W/OPTIC: CPT | Mod: QW,,, | Performed by: NURSE PRACTITIONER

## 2023-05-07 RX ORDER — PREDNISONE 20 MG/1
TABLET ORAL
Qty: 7 TABLET | Refills: 0 | Status: ON HOLD | OUTPATIENT
Start: 2023-05-07 | End: 2023-05-13 | Stop reason: HOSPADM

## 2023-05-07 NOTE — PROGRESS NOTES
"Subjective:       Patient ID: Kalin Sidhu IV is a 28 y.o. male.    Vitals:  height is 5' 2" (1.575 m) and weight is 70.3 kg (155 lb). His oral temperature is 98 °F (36.7 °C). His blood pressure is 130/78 and his pulse is 94. His oxygen saturation is 97%.     Chief Complaint: Sinus Problem (X 1 1/2 weeks with sinus congestion, post nasal drip, states that he had muscle pains all over.)    This is a 28 y.o. male who presents today with a chief complaint of sinus congestion.    Patient presents with:  Patient experiencing nasal congestion with nasal discharge scratchy throat cough and body aches over the past week.    Sinus Problem  This is a new problem. The current episode started 1 to 4 weeks ago. The problem is unchanged. There has been no fever. His pain is at a severity of 2/10. The pain is mild. Associated symptoms include congestion and sinus pressure. Past treatments include nothing.     Constitution: Negative.   HENT:  Positive for congestion and sinus pressure.    Respiratory: Negative.           Objective:      Physical Exam   Constitutional: He is oriented to person, place, and time. He appears well-developed. He is cooperative.  Non-toxic appearance. He does not appear ill. No distress.   HENT:   Head: Normocephalic and atraumatic.   Ears:   Right Ear: Hearing, tympanic membrane, external ear and ear canal normal.   Left Ear: Hearing, tympanic membrane, external ear and ear canal normal.   Nose: Nose normal. No mucosal edema, rhinorrhea or nasal deformity. No epistaxis. Right sinus exhibits no maxillary sinus tenderness and no frontal sinus tenderness. Left sinus exhibits no maxillary sinus tenderness and no frontal sinus tenderness.   Mouth/Throat: Uvula is midline, oropharynx is clear and moist and mucous membranes are normal. No trismus in the jaw. Normal dentition. No uvula swelling. No oropharyngeal exudate, posterior oropharyngeal edema or posterior oropharyngeal erythema.   Eyes: " Conjunctivae and lids are normal. No scleral icterus.   Neck: Trachea normal and phonation normal. Neck supple. No edema present. No erythema present. No neck rigidity present.   Cardiovascular: Normal rate, regular rhythm, normal heart sounds and normal pulses.   Pulmonary/Chest: Effort normal and breath sounds normal. No respiratory distress. He has no decreased breath sounds. He has no wheezes. He has no rhonchi.   Abdominal: Normal appearance.   Musculoskeletal: Normal range of motion.         General: No deformity. Normal range of motion.   Neurological: He is alert and oriented to person, place, and time. He exhibits normal muscle tone. Coordination normal.   Skin: Skin is warm, dry, intact, not diaphoretic and not pale.   Psychiatric: His speech is normal and behavior is normal. Judgment and thought content normal.   Nursing note and vitals reviewed.      Past medical history and current medications reviewed.       Assessment:           1. Acute viral syndrome    2. Body aches    3. Head congestion              Plan:         Acute viral syndrome    Body aches  -     SARS Coronavirus 2 Antigen, POCT Manual Read    Head congestion  -     SARS Coronavirus 2 Antigen, POCT Manual Read  -     predniSONE (DELTASONE) 20 MG tablet; Take 2 tablets (40 mg total) by mouth once daily for 2 days, THEN 1 tablet (20 mg total) once daily for 3 days.  Dispense: 7 tablet; Refill: 0             Patient Instructions   Please return here or go to the Emergency Department for any concerns or worsening of condition.  Please drink plenty of fluids.  Please get plenty of rest.  If you were prescribed antibiotics, please take them to completion.  If you were given wait & see antibiotics, please wait 5-7 days before taking them, and only take them if your symptoms have worsened or not improved.  If you do begin taking the antibiotics, please take them to completion.  If you were given a steroid shot in the clinic and have also been  given a prescription for a steroid such as Prednisone or a Medrol Dose Pack, please begin taking them tomorrow.  If you do not have Hypertension or any history of palpitations, it is ok to take over the counter Sudafed or Mucinex D or Allegra-D or Claritin-D or Zyrtec-D.  If you do take one of the above, it is ok to combine that with plain over the counter Mucinex or Allegra or Claritin or Zyrtec.  If for example you are taking Zyrtec -D, you can combine that with Mucinex, but not Mucinex-D.  If you are taking Mucinex-D, you can combine that with plain Allegra or Claritin or Zyrtec.   If you do have Hypertension or palpitations, it is safe to take Coricidin HBP for relief of sinus symptoms.  If not allergic, please take over the counter Tylenol (Acetaminophen) and/or Motrin (Ibuprofen) as directed for control of pain and/or fever.  Please follow up with your primary care doctor or specialist as needed.    If you  smoke, please stop smoking.         Saul Ruiz, AMADORP-C

## 2023-05-13 ENCOUNTER — HOSPITAL ENCOUNTER (EMERGENCY)
Facility: HOSPITAL | Age: 28
Discharge: HOME | End: 2023-05-13
Attending: EMERGENCY MEDICINE
Payer: COMMERCIAL

## 2023-05-13 VITALS
BODY MASS INDEX: 28.52 KG/M2 | WEIGHT: 155 LBS | RESPIRATION RATE: 20 BRPM | DIASTOLIC BLOOD PRESSURE: 93 MMHG | OXYGEN SATURATION: 97 % | HEIGHT: 62 IN | TEMPERATURE: 99 F | HEART RATE: 101 BPM | SYSTOLIC BLOOD PRESSURE: 159 MMHG

## 2023-05-13 DIAGNOSIS — T14.8XXA OPEN FRACTURE: Primary | ICD-10-CM

## 2023-05-13 PROBLEM — S62.630B OPEN DISPLACED FRACTURE OF DISTAL PHALANX OF RIGHT INDEX FINGER: Status: ACTIVE | Noted: 2023-05-13

## 2023-05-13 PROBLEM — S61.319A NAILBED LACERATION, FINGER, INITIAL ENCOUNTER: Status: ACTIVE | Noted: 2023-05-13

## 2023-05-13 PROCEDURE — 96374 THER/PROPH/DIAG INJ IV PUSH: CPT

## 2023-05-13 PROCEDURE — 90471 IMMUNIZATION ADMIN: CPT | Performed by: NURSE PRACTITIONER

## 2023-05-13 PROCEDURE — 63600175 PHARM REV CODE 636 W HCPCS: Performed by: NURSE PRACTITIONER

## 2023-05-13 PROCEDURE — 25000003 PHARM REV CODE 250: Performed by: NURSE PRACTITIONER

## 2023-05-13 PROCEDURE — 90715 TDAP VACCINE 7 YRS/> IM: CPT | Performed by: NURSE PRACTITIONER

## 2023-05-13 PROCEDURE — 73140 XR FINGER 2 OR MORE VIEWS RIGHT: ICD-10-PCS | Mod: 26,RT,, | Performed by: RADIOLOGY

## 2023-05-13 PROCEDURE — 99285 EMERGENCY DEPT VISIT HI MDM: CPT

## 2023-05-13 PROCEDURE — 73140 X-RAY EXAM OF FINGER(S): CPT | Mod: 26,RT,, | Performed by: RADIOLOGY

## 2023-05-13 PROCEDURE — 73140 X-RAY EXAM OF FINGER(S): CPT | Mod: TC,RT

## 2023-05-13 RX ORDER — HYDROCODONE BITARTRATE AND ACETAMINOPHEN 7.5; 325 MG/1; MG/1
1 TABLET ORAL
Status: COMPLETED | OUTPATIENT
Start: 2023-05-13 | End: 2023-05-13

## 2023-05-13 RX ORDER — CEFAZOLIN SODIUM 1 G/3ML
1 INJECTION, POWDER, FOR SOLUTION INTRAMUSCULAR; INTRAVENOUS
Status: DISCONTINUED | OUTPATIENT
Start: 2023-05-13 | End: 2023-05-13 | Stop reason: HOSPADM

## 2023-05-13 RX ORDER — LIDOCAINE HYDROCHLORIDE 10 MG/ML
10 INJECTION, SOLUTION EPIDURAL; INFILTRATION; INTRACAUDAL; PERINEURAL
Status: DISCONTINUED | OUTPATIENT
Start: 2023-05-13 | End: 2023-05-13 | Stop reason: HOSPADM

## 2023-05-13 RX ADMIN — TETANUS TOXOID, REDUCED DIPHTHERIA TOXOID AND ACELLULAR PERTUSSIS VACCINE, ADSORBED 0.5 ML: 5; 2.5; 8; 8; 2.5 SUSPENSION INTRAMUSCULAR at 03:05

## 2023-05-13 RX ADMIN — CEFAZOLIN 1 G: 330 INJECTION, POWDER, FOR SOLUTION INTRAMUSCULAR; INTRAVENOUS at 04:05

## 2023-05-13 RX ADMIN — HYDROCODONE BITARTRATE AND ACETAMINOPHEN 1 TABLET: 7.5; 325 TABLET ORAL at 03:05

## 2023-05-13 NOTE — ED NOTES
PATIENT MEDICATED AS PER ORDERS; WET TO DRY ABDS APPLIED TO 1ST AND SECOND FINGERS, WITH OCL APPLIED TO STABILIZE/PROTECT THE FIRST TWO DIGITS.

## 2023-05-13 NOTE — ED PROVIDER NOTES
Encounter Date: 5/13/2023       History     Chief Complaint   Patient presents with    Laceration     Right index finger laceration after being struck with a pipe bar, damage to finger nail bed and side of finger, bleeding controlled at this time.     POV to ED with co-worker.  Patient complains of crush injury to his right index finger onset about 1-1/2 hours prior to arrival.  He reports that his finger was crushed between 2 pipe bars, each having a metal head, he denies other injuries.  Bleeding controlled at arrival.  Tetanus unknown.  No other complaints    The history is provided by the patient.   Review of patient's allergies indicates:  No Known Allergies  History reviewed. No pertinent past medical history.  Past Surgical History:   Procedure Laterality Date    ANKLE FRACTURE SURGERY  9/2014    Rt ankle     Family History   Problem Relation Age of Onset    No Known Problems Mother     No Known Problems Father     Collagen disease Neg Hx      Social History     Tobacco Use    Smoking status: Never    Smokeless tobacco: Current    Tobacco comments:     Dip tabacco   Substance Use Topics    Alcohol use: No     Comment: rarely    Drug use: No     Review of Systems   Constitutional:  Negative for chills and fever.   HENT:  Negative for ear pain and sore throat.    Respiratory:  Negative for cough and shortness of breath.    Cardiovascular:  Negative for chest pain.   Gastrointestinal:  Negative for abdominal pain, diarrhea, nausea and vomiting.   Musculoskeletal:         Crush injury to right index finger, laceration, deformity   Skin:  Positive for wound.   All other systems reviewed and are negative.    Physical Exam     Initial Vitals [05/13/23 1540]   BP Pulse Resp Temp SpO2   (!) 159/93 101 20 -- 97 %      MAP       --         Physical Exam    Nursing note and vitals reviewed.  Constitutional: He appears well-developed and well-nourished. No distress.   HENT:   Head: Normocephalic.   Mouth/Throat:  Oropharynx is clear and moist.   Eyes: Pupils are equal, round, and reactive to light.   Neck:   Normal range of motion.  Cardiovascular:  Normal rate, regular rhythm and normal heart sounds.           Pulmonary/Chest: Breath sounds normal.   Abdominal: Abdomen is soft. There is no abdominal tenderness.   Musculoskeletal:      Cervical back: Normal range of motion.      Comments: Noted jagged irregular laceration to the distal tip of the right index finger.  Through the nailbed.  Deformity noted.  Sensation intact     Neurological: He is alert and oriented to person, place, and time. GCS score is 15. GCS eye subscore is 4. GCS verbal subscore is 5. GCS motor subscore is 6.   Skin: Skin is warm and dry.   Psychiatric: He has a normal mood and affect.       ED Course   Procedures  Labs Reviewed - No data to display       Imaging Results              X-Ray Finger 2 or More Views Right (Final result)  Result time 05/13/23 14:32:25      Final result by Carla Pappas MD (05/13/23 14:32:25)                   Impression:      Acute tuft fracture of the 2nd finger distal phalanx.    Multiple radiopaque densities projecting within the soft tissues of the 2nd finger.  Correlate for retained foreign bodies.      Electronically signed by: Carla Pappas  Date:    05/13/2023  Time:    14:32               Narrative:    EXAMINATION:  XR FINGER 2 OR MORE VIEWS RIGHT    CLINICAL HISTORY:  smash injury right index finger;    TECHNIQUE:  Three views of the right 2nd finger    COMPARISON:  None    FINDINGS:  Acute, displaced fracture of the 2nd finger distal phalangeal tuft.  Overlying soft tissue swelling.  Multiple punctate radiopaque foreign bodies within the soft tissues of the 2nd finger.  No dislocation.  No aggressive osseous abnormality.                                    X-Rays:   Independently Interpreted Readings:   Other Readings:  EXAMINATION:  XR FINGER 2 OR MORE VIEWS RIGHT     CLINICAL HISTORY:  smash injury right index  finger;     TECHNIQUE:  Three views of the right 2nd finger     COMPARISON:  None     FINDINGS:  Acute, displaced fracture of the 2nd finger distal phalangeal tuft.  Overlying soft tissue swelling.  Multiple punctate radiopaque foreign bodies within the soft tissues of the 2nd finger.  No dislocation.  No aggressive osseous abnormality.     Impression:     Acute tuft fracture of the 2nd finger distal phalanx.     Multiple radiopaque densities projecting within the soft tissues of the 2nd finger.  Correlate for retained foreign bodies.  Medications   LIDOcaine (PF) 10 mg/ml (1%) injection 100 mg (has no administration in time range)   ceFAZolin injection 1 g (has no administration in time range)   neomycin-bacitracnZn-polymyxnB packet (has no administration in time range)   HYDROcodone-acetaminophen 7.5-325 mg per tablet 1 tablet (has no administration in time range)   Tdap (BOOSTRIX) vaccine injection 0.5 mL (0.5 mLs Intramuscular Given 5/13/23 1530)     Medical Decision Making:   Initial Assessment:   Presents for evaluation of crush injury to right index finger, laceration noted.  Deformity noted.  X-ray ordered.  Disposition pending  Differential Diagnosis:   Fracture, contusion, laceration, foreign body, dislocation, amputation  ED Management:  X-ray:  Noted fracture, dislocation distal tuft.  Open fracture.  Laceration extending through the nailbed into the palmar surface of the distal phalanx, multiple foreign bodies noted on x-ray.  Transfer services utilized.  Dr. Jovan Burleson with [facility] ED accepts the patient for an ED to ED transfer.  The patient in his co-worker agree to go by POV.   is in agreement with this. Dr Finley is in agreement with this.  RN to call report [phone].  Patient has had Betadine soak, pain medication, Ancef 1 g IM prior to discharge.  Dressing with finger splint application prior to discharge.  He agrees with plan of care  Other:   I discussed test(s) with  the performing physician.       <> Summary of the Findings: Dr Finley                        Clinical Impression:                Clarita Grier, DAWSON  05/13/23 1602       Clarita Grier NP  05/13/23 1606       Clarita Grier, DAWSON  05/13/23 1612

## 2023-05-19 ENCOUNTER — OFFICE VISIT (OUTPATIENT)
Dept: ORTHOPEDICS | Facility: CLINIC | Age: 28
End: 2023-05-19
Payer: COMMERCIAL

## 2023-05-19 DIAGNOSIS — S62.630D OPEN DISPLACED FRACTURE OF DISTAL PHALANX OF RIGHT INDEX FINGER WITH ROUTINE HEALING, SUBSEQUENT ENCOUNTER: Primary | ICD-10-CM

## 2023-05-19 DIAGNOSIS — S61.319A NAILBED LACERATION, FINGER, INITIAL ENCOUNTER: ICD-10-CM

## 2023-05-19 PROCEDURE — 1159F PR MEDICATION LIST DOCUMENTED IN MEDICAL RECORD: ICD-10-PCS | Mod: CPTII,S$GLB,, | Performed by: ORTHOPAEDIC SURGERY

## 2023-05-19 PROCEDURE — 99024 PR POST-OP FOLLOW-UP VISIT: ICD-10-PCS | Mod: S$GLB,,, | Performed by: ORTHOPAEDIC SURGERY

## 2023-05-19 PROCEDURE — 99999 PR PBB SHADOW E&M-EST. PATIENT-LVL II: ICD-10-PCS | Mod: PBBFAC,,, | Performed by: ORTHOPAEDIC SURGERY

## 2023-05-19 PROCEDURE — 99999 PR PBB SHADOW E&M-EST. PATIENT-LVL II: CPT | Mod: PBBFAC,,, | Performed by: ORTHOPAEDIC SURGERY

## 2023-05-19 PROCEDURE — 1159F MED LIST DOCD IN RCRD: CPT | Mod: CPTII,S$GLB,, | Performed by: ORTHOPAEDIC SURGERY

## 2023-05-19 PROCEDURE — 99024 POSTOP FOLLOW-UP VISIT: CPT | Mod: S$GLB,,, | Performed by: ORTHOPAEDIC SURGERY

## 2023-05-22 NOTE — PROGRESS NOTES
Mr Sidhu returns to clinic today.  Has a history of a right index finger open distal phalanx fracture with nail bed laceration.  He underwent irrigation and debridement with repair of the nailbed and pinning.  He is overall doing well     Physical exam: Examination the right hand and index finger reveals that the wounds are healing.  There is moderate edema.  He has a K-wire in place.  There is no erythema or drainage at the K-wire site.      Radiology:  X-rays of the right index finger were taken in clinic today.  He is noted have fracture of the distal phalanx which is stabilized with K-wire fixation.      Assessment:  Right index finger open distal phalanx fracture with nail bed laceration     Plan:    1.  His wounds and K-wire site were cleaned    2. A static Alumafoam finger splint was placed    3. Will continue limited weight-bearing    4. Will follow up in 1 week for repeat evaluation with an x-ray of the right index finger

## 2023-05-23 DIAGNOSIS — S62.630D OPEN DISPLACED FRACTURE OF DISTAL PHALANX OF RIGHT INDEX FINGER WITH ROUTINE HEALING, SUBSEQUENT ENCOUNTER: Primary | ICD-10-CM

## 2023-05-26 ENCOUNTER — OFFICE VISIT (OUTPATIENT)
Dept: ORTHOPEDICS | Facility: CLINIC | Age: 28
End: 2023-05-26
Payer: COMMERCIAL

## 2023-05-26 ENCOUNTER — HOSPITAL ENCOUNTER (OUTPATIENT)
Dept: RADIOLOGY | Facility: HOSPITAL | Age: 28
Discharge: HOME | End: 2023-05-26
Attending: ORTHOPAEDIC SURGERY
Payer: COMMERCIAL

## 2023-05-26 VITALS — HEIGHT: 62 IN | WEIGHT: 155 LBS | BODY MASS INDEX: 28.52 KG/M2

## 2023-05-26 DIAGNOSIS — S62.630D OPEN DISPLACED FRACTURE OF DISTAL PHALANX OF RIGHT INDEX FINGER WITH ROUTINE HEALING, SUBSEQUENT ENCOUNTER: Primary | ICD-10-CM

## 2023-05-26 DIAGNOSIS — S61.319A NAILBED LACERATION, FINGER, INITIAL ENCOUNTER: ICD-10-CM

## 2023-05-26 DIAGNOSIS — S62.630D OPEN DISPLACED FRACTURE OF DISTAL PHALANX OF RIGHT INDEX FINGER WITH ROUTINE HEALING, SUBSEQUENT ENCOUNTER: ICD-10-CM

## 2023-05-26 PROCEDURE — 1159F PR MEDICATION LIST DOCUMENTED IN MEDICAL RECORD: ICD-10-PCS | Mod: CPTII,S$GLB,, | Performed by: ORTHOPAEDIC SURGERY

## 2023-05-26 PROCEDURE — 1159F MED LIST DOCD IN RCRD: CPT | Mod: CPTII,S$GLB,, | Performed by: ORTHOPAEDIC SURGERY

## 2023-05-26 PROCEDURE — 73140 X-RAY EXAM OF FINGER(S): CPT | Mod: 26,RT,, | Performed by: RADIOLOGY

## 2023-05-26 PROCEDURE — 99024 POSTOP FOLLOW-UP VISIT: CPT | Mod: S$GLB,,, | Performed by: ORTHOPAEDIC SURGERY

## 2023-05-26 PROCEDURE — 99999 PR PBB SHADOW E&M-EST. PATIENT-LVL III: ICD-10-PCS | Mod: PBBFAC,,, | Performed by: ORTHOPAEDIC SURGERY

## 2023-05-26 PROCEDURE — 29130 PR APPLY FINGER SPLINT,STATIC: ICD-10-PCS | Mod: 58,RT,S$GLB, | Performed by: ORTHOPAEDIC SURGERY

## 2023-05-26 PROCEDURE — 3008F BODY MASS INDEX DOCD: CPT | Mod: CPTII,S$GLB,, | Performed by: ORTHOPAEDIC SURGERY

## 2023-05-26 PROCEDURE — 99024 PR POST-OP FOLLOW-UP VISIT: ICD-10-PCS | Mod: S$GLB,,, | Performed by: ORTHOPAEDIC SURGERY

## 2023-05-26 PROCEDURE — 73140 X-RAY EXAM OF FINGER(S): CPT | Mod: TC,PO,RT

## 2023-05-26 PROCEDURE — 29130 APPL FINGER SPLINT STATIC: CPT | Mod: 58,RT,S$GLB, | Performed by: ORTHOPAEDIC SURGERY

## 2023-05-26 PROCEDURE — 99999 PR PBB SHADOW E&M-EST. PATIENT-LVL III: CPT | Mod: PBBFAC,,, | Performed by: ORTHOPAEDIC SURGERY

## 2023-05-26 PROCEDURE — 73140 XR FINGER 2 OR MORE VIEWS RIGHT: ICD-10-PCS | Mod: 26,RT,, | Performed by: RADIOLOGY

## 2023-05-26 PROCEDURE — 3008F PR BODY MASS INDEX (BMI) DOCUMENTED: ICD-10-PCS | Mod: CPTII,S$GLB,, | Performed by: ORTHOPAEDIC SURGERY

## 2023-05-26 NOTE — PROGRESS NOTES
Mr Sidhu returns to clinic today.  He has a history of open fracture with nail bed laceration of his right index finger.  He has been in a splint.  He is 2 weeks post injury.  He has no new complaints.      Physical exam:  Examination of the right index finger reveals that the wounds are healing well.  There is mild edema.  There is a K-wire in place without erythema or drainage.  There is no proximal tenderness.      Radiology:  X-rays of the right index finger taken in clinic today.  There is noted to be a small interval change in alignment.  There is a 2 mm gap at the fracture site.  The K-wire still remains in place but has backed out 1 mm.      Assessment:  Right index finger open distal phalanx fracture with nail bed laceration    Plan:    1. The alignment is still acceptable and therefore we will continue to monitor on the x-ray    2. His wounds were clean today and a new Alumafoam static finger splint was placed     3. Had a long discussion with the patient about appropriate use of his hand.  I have discussed the need to be very limited activity with nonweightbearing to prevent any further displacement of the fracture.  He has expressed an understanding    4.  He will follow up in 2 weeks with repeat x-ray of the right index finger out of the splint which point I will consider discontinuing the K-wire

## 2023-06-09 DIAGNOSIS — S62.630D OPEN DISPLACED FRACTURE OF DISTAL PHALANX OF RIGHT INDEX FINGER WITH ROUTINE HEALING, SUBSEQUENT ENCOUNTER: Primary | ICD-10-CM

## 2023-06-12 ENCOUNTER — HOSPITAL ENCOUNTER (OUTPATIENT)
Dept: RADIOLOGY | Facility: HOSPITAL | Age: 28
Discharge: HOME | End: 2023-06-12
Attending: ORTHOPAEDIC SURGERY
Payer: COMMERCIAL

## 2023-06-12 ENCOUNTER — OFFICE VISIT (OUTPATIENT)
Dept: ORTHOPEDICS | Facility: CLINIC | Age: 28
End: 2023-06-12
Payer: COMMERCIAL

## 2023-06-12 DIAGNOSIS — S61.319A NAILBED LACERATION, FINGER, INITIAL ENCOUNTER: ICD-10-CM

## 2023-06-12 DIAGNOSIS — S62.630D OPEN DISPLACED FRACTURE OF DISTAL PHALANX OF RIGHT INDEX FINGER WITH ROUTINE HEALING, SUBSEQUENT ENCOUNTER: ICD-10-CM

## 2023-06-12 DIAGNOSIS — Z96.9 PRESENCE OF RETAINED HARDWARE: ICD-10-CM

## 2023-06-12 DIAGNOSIS — S62.630D OPEN DISPLACED FRACTURE OF DISTAL PHALANX OF RIGHT INDEX FINGER WITH ROUTINE HEALING, SUBSEQUENT ENCOUNTER: Primary | ICD-10-CM

## 2023-06-12 PROCEDURE — 20670 REMOVAL IMPLANT SUPERFICIAL: CPT | Mod: 58,RT,S$GLB, | Performed by: ORTHOPAEDIC SURGERY

## 2023-06-12 PROCEDURE — 99024 POSTOP FOLLOW-UP VISIT: CPT | Mod: S$GLB,,, | Performed by: ORTHOPAEDIC SURGERY

## 2023-06-12 PROCEDURE — 20670 PR REMOVAL SUPERFICIAL IMPLANT: ICD-10-PCS | Mod: 58,RT,S$GLB, | Performed by: ORTHOPAEDIC SURGERY

## 2023-06-12 PROCEDURE — 99999 PR PBB SHADOW E&M-EST. PATIENT-LVL II: ICD-10-PCS | Mod: PBBFAC,,, | Performed by: ORTHOPAEDIC SURGERY

## 2023-06-12 PROCEDURE — 29130 APPL FINGER SPLINT STATIC: CPT | Mod: 58,51,RT,S$GLB | Performed by: ORTHOPAEDIC SURGERY

## 2023-06-12 PROCEDURE — 99999 PR PBB SHADOW E&M-EST. PATIENT-LVL II: CPT | Mod: PBBFAC,,, | Performed by: ORTHOPAEDIC SURGERY

## 2023-06-12 PROCEDURE — 29130 PR APPLY FINGER SPLINT,STATIC: ICD-10-PCS | Mod: 58,51,RT,S$GLB | Performed by: ORTHOPAEDIC SURGERY

## 2023-06-12 PROCEDURE — 73140 XR FINGER 2 OR MORE VIEWS RIGHT: ICD-10-PCS | Mod: 26,RT,, | Performed by: RADIOLOGY

## 2023-06-12 PROCEDURE — 73140 X-RAY EXAM OF FINGER(S): CPT | Mod: TC,PO,RT

## 2023-06-12 PROCEDURE — 99024 PR POST-OP FOLLOW-UP VISIT: ICD-10-PCS | Mod: S$GLB,,, | Performed by: ORTHOPAEDIC SURGERY

## 2023-06-12 PROCEDURE — 73140 X-RAY EXAM OF FINGER(S): CPT | Mod: 26,RT,, | Performed by: RADIOLOGY

## 2023-06-12 NOTE — PROGRESS NOTES
Mr Sidhu returns to clinic today.  Has a history of an open distal phalanx fracture of the right index finger with nail bed laceration.  He is overall doing well.  He has no new complaints     Physical exam:  Examination the right index finger reveals that all wounds are healing well.  There is a foil nail plate remaining in place.  There is a K-wire in place.  There is no erythema or drainage at the K-wire site.  Has capillary refill less than 2 seconds at the tip of the finger     Radiology:  X-rays of the right index finger taken in clinic today.  There is noted to be a fracture of the distal phalanx which is 1-2 mm displaced.  It was stabilized with K-wire fixation     Assessment:  Right index finger open distal phalanx fracture with nailbed laceration, right index finger retained orthopedic hardware    Plan:    1. The right index finger was prepped sterilely.  Needle  was used to remove the K-wire.  The wire was removed without breakage or injury.  The wound was dressed with a sterile dressing.  A Alumafoam static finger splint was then placed.      2.  Patient will keep the splint in place for 2 weeks.  He will follow up with me at that time with repeat x-rays of index finger at which point I will consider beginning range of motion of the distal interphalangeal joint

## 2023-06-23 DIAGNOSIS — S62.630D OPEN DISPLACED FRACTURE OF DISTAL PHALANX OF RIGHT INDEX FINGER WITH ROUTINE HEALING, SUBSEQUENT ENCOUNTER: Primary | ICD-10-CM

## 2023-06-26 ENCOUNTER — OFFICE VISIT (OUTPATIENT)
Dept: ORTHOPEDICS | Facility: CLINIC | Age: 28
End: 2023-06-26
Payer: COMMERCIAL

## 2023-06-26 ENCOUNTER — HOSPITAL ENCOUNTER (OUTPATIENT)
Dept: RADIOLOGY | Facility: HOSPITAL | Age: 28
Discharge: HOME | End: 2023-06-26
Attending: ORTHOPAEDIC SURGERY
Payer: COMMERCIAL

## 2023-06-26 VITALS — BODY MASS INDEX: 28.52 KG/M2 | WEIGHT: 155 LBS | HEIGHT: 62 IN

## 2023-06-26 DIAGNOSIS — S62.630D OPEN DISPLACED FRACTURE OF DISTAL PHALANX OF RIGHT INDEX FINGER WITH ROUTINE HEALING, SUBSEQUENT ENCOUNTER: ICD-10-CM

## 2023-06-26 DIAGNOSIS — S61.319A NAILBED LACERATION, FINGER, INITIAL ENCOUNTER: Primary | ICD-10-CM

## 2023-06-26 PROCEDURE — 99024 POSTOP FOLLOW-UP VISIT: CPT | Mod: S$GLB,,, | Performed by: ORTHOPAEDIC SURGERY

## 2023-06-26 PROCEDURE — 73140 XR FINGER 2 OR MORE VIEWS RIGHT: ICD-10-PCS | Mod: 26,RT,, | Performed by: RADIOLOGY

## 2023-06-26 PROCEDURE — 99999 PR PBB SHADOW E&M-EST. PATIENT-LVL II: ICD-10-PCS | Mod: PBBFAC,,, | Performed by: ORTHOPAEDIC SURGERY

## 2023-06-26 PROCEDURE — 99024 PR POST-OP FOLLOW-UP VISIT: ICD-10-PCS | Mod: S$GLB,,, | Performed by: ORTHOPAEDIC SURGERY

## 2023-06-26 PROCEDURE — 99999 PR PBB SHADOW E&M-EST. PATIENT-LVL II: CPT | Mod: PBBFAC,,, | Performed by: ORTHOPAEDIC SURGERY

## 2023-06-26 PROCEDURE — 73140 X-RAY EXAM OF FINGER(S): CPT | Mod: 26,RT,, | Performed by: RADIOLOGY

## 2023-06-26 PROCEDURE — 73140 X-RAY EXAM OF FINGER(S): CPT | Mod: TC,PO,RT

## 2023-06-26 NOTE — PROGRESS NOTES
Mr Sidhu to clinic today.  Has a history of right index finger open distal phalanx fracture with nail bed laceration.  He is doing well.      Physical exam:  Examination of the right index finger reveals that all wounds are healed.  The 4-0 now remains in place.  There is no significant edema or erythema.  He is not having significant tenderness to palpation     Radiology:  X-rays of the right index finger taken in clinic today.  He is noted have a fracture of the distal phalanx.  Remains 1-2 mm displaced but is stable in alignment     Assessment:  Right index finger open distal phalanx fracture with nail bed laceration     Plan:    1. He will begin daily washing and dressing change    2. Can begin range of motion of the PIP and D IP joints    3. Will follow up in 2-3 weeks with repeat x-rays of the right index finger for repeat evaluation which point will consider releasing him to activity as tolerated

## 2023-07-11 DIAGNOSIS — S62.630D OPEN DISPLACED FRACTURE OF DISTAL PHALANX OF RIGHT INDEX FINGER WITH ROUTINE HEALING, SUBSEQUENT ENCOUNTER: Primary | ICD-10-CM

## 2023-07-17 ENCOUNTER — HOSPITAL ENCOUNTER (OUTPATIENT)
Dept: RADIOLOGY | Facility: HOSPITAL | Age: 28
Discharge: HOME | End: 2023-07-17
Attending: ORTHOPAEDIC SURGERY
Payer: COMMERCIAL

## 2023-07-17 ENCOUNTER — OFFICE VISIT (OUTPATIENT)
Dept: ORTHOPEDICS | Facility: CLINIC | Age: 28
End: 2023-07-17
Payer: COMMERCIAL

## 2023-07-17 DIAGNOSIS — S62.630D OPEN DISPLACED FRACTURE OF DISTAL PHALANX OF RIGHT INDEX FINGER WITH ROUTINE HEALING, SUBSEQUENT ENCOUNTER: ICD-10-CM

## 2023-07-17 DIAGNOSIS — S62.630D OPEN DISPLACED FRACTURE OF DISTAL PHALANX OF RIGHT INDEX FINGER WITH ROUTINE HEALING, SUBSEQUENT ENCOUNTER: Primary | ICD-10-CM

## 2023-07-17 DIAGNOSIS — S61.319A NAILBED LACERATION, FINGER, INITIAL ENCOUNTER: ICD-10-CM

## 2023-07-17 PROCEDURE — 73140 X-RAY EXAM OF FINGER(S): CPT | Mod: TC,PO,RT

## 2023-07-17 PROCEDURE — 99024 PR POST-OP FOLLOW-UP VISIT: ICD-10-PCS | Mod: S$GLB,,, | Performed by: ORTHOPAEDIC SURGERY

## 2023-07-17 PROCEDURE — 99024 POSTOP FOLLOW-UP VISIT: CPT | Mod: S$GLB,,, | Performed by: ORTHOPAEDIC SURGERY

## 2023-07-17 PROCEDURE — 99999 PR PBB SHADOW E&M-EST. PATIENT-LVL I: ICD-10-PCS | Mod: PBBFAC,,, | Performed by: ORTHOPAEDIC SURGERY

## 2023-07-17 PROCEDURE — 99999 PR PBB SHADOW E&M-EST. PATIENT-LVL I: CPT | Mod: PBBFAC,,, | Performed by: ORTHOPAEDIC SURGERY

## 2023-07-17 PROCEDURE — 73140 X-RAY EXAM OF FINGER(S): CPT | Mod: 26,RT,, | Performed by: RADIOLOGY

## 2023-07-17 PROCEDURE — 73140 XR FINGER 2 OR MORE VIEWS RIGHT: ICD-10-PCS | Mod: 26,RT,, | Performed by: RADIOLOGY

## 2023-07-17 NOTE — PROGRESS NOTES
Mr Sidhu returns to clinic today.  Has a history of right index finger open distal phalanx fracture with nailbed laceration.  He states that he still has some hyperextension activity but he is getting better.  He is able to use the finger.      Physical exam:  Examination of the right index finger reveals that all wounds are healed.  He still has a nail deformity.  Palpation over the tip only produces mild tenderness.  He is able to flex and extend at the PIP and D IP joints.      Radiology:  X-rays of the right index finger taken in clinic today.  The films have been reviewed by me.  There is noted to be an obliquely oriented fracture of the distal phalanx.  The distal fragment of proximally 1 mm displaced which is unchanged from previous x-ray     Assessment:  Right index finger open distal phalanx fracture with nailbed laceration     Plan:    1.  Is allowed to resume activity as tolerated including full work duty     2. He will follow up with me on a p.r.n. basis.

## 2023-07-17 NOTE — LETTER
July 17, 2023      [facility]  [address] [place] [address]  Phone: [phone]       Patient: Kalin Sidhu   YOB: 1995  Date of Visit: 07/17/2023    To Whom It May Concern:    Safia Sidhu  was at [facility] on 07/17/2023. He can return back to work with no restrictions.    Sincerely,    Jimmie Flannery MD

## 2023-10-26 ENCOUNTER — HOSPITAL ENCOUNTER (EMERGENCY)
Facility: HOSPITAL | Age: 28
Discharge: HOME | End: 2023-10-26
Attending: EMERGENCY MEDICINE
Payer: COMMERCIAL

## 2023-10-26 VITALS
TEMPERATURE: 98 F | BODY MASS INDEX: 29.44 KG/M2 | HEIGHT: 62 IN | WEIGHT: 160 LBS | HEART RATE: 95 BPM | SYSTOLIC BLOOD PRESSURE: 159 MMHG | RESPIRATION RATE: 19 BRPM | DIASTOLIC BLOOD PRESSURE: 119 MMHG | OXYGEN SATURATION: 98 %

## 2023-10-26 DIAGNOSIS — I10 HYPERTENSION, UNSPECIFIED TYPE: Primary | ICD-10-CM

## 2023-10-26 PROCEDURE — 99281 EMR DPT VST MAYX REQ PHY/QHP: CPT

## 2023-10-27 NOTE — ED PROVIDER NOTES
Encounter Date: 10/26/2023       History     Chief Complaint   Patient presents with    Hypertension     Patient reports that he has been checking his blood pressure throughout the week and it has been high. Patient reports that his blood pressure was 160/110 at home.   Patient denies pain and headache.      28-year-old male here with asymptomatic hypertension.  Patient states hypertension runs in his family he decided to check his blood pressure several times this week using family members blood pressure cuff and noticed that it was elevated.  While he was at [facility] he checked his blood pressure again it stated 160/110 and that is scared him so he decided to come to the emergency department for evaluation.  He is asymptomatic at this time does not have a primary care provider.    The history is provided by the patient. No  was used.     Review of patient's allergies indicates:  No Known Allergies  History reviewed. No pertinent past medical history.  Past Surgical History:   Procedure Laterality Date    ANKLE FRACTURE SURGERY  9/2014    Rt ankle    APPLICATION OF SPLINT Right 5/13/2023    Procedure: APPLICATION, SPLINT;  Surgeon: Jimmie Flannery MD;  Location: [facility] OR;  Service: Orthopedics;  Laterality: Right;    IRRIGATION AND DEBRIDEMENT OF UPPER EXTREMITY Right 5/13/2023    Procedure: IRRIGATION AND DEBRIDEMENT, index finger;  Surgeon: Jimmie Flannery MD;  Location: [facility] OR;  Service: Orthopedics;  Laterality: Right;    PERCUTANEOUS PINNING OF FINGER Right 5/13/2023    Procedure: PINNING, FINGER, open distal phalanx fracture- right index finger;  Surgeon: Jimmie Flannery MD;  Location: [facility] OR;  Service: Orthopedics;  Laterality: Right;    REPAIR OF LACERATION Right 5/13/2023    Procedure: REPAIR, LACERATION- right index finger;  Surgeon: Jimmie Flannery MD;  Location: [facility] OR;  Service: Orthopedics;  Laterality: Right;    REPAIR OF NAIL BED  5/13/2023    Procedure: REPAIR,  NAIL BED- right index finger;  Surgeon: Jimmie Flannery MD;  Location: [facility];  Service: Orthopedics;;     Family History   Problem Relation Age of Onset    No Known Problems Mother     No Known Problems Father     Collagen disease Neg Hx      Social History     Tobacco Use    Smoking status: Never    Smokeless tobacco: Current     Types: Snuff    Tobacco comments:     Dip tabacco   Substance Use Topics    Alcohol use: No     Comment: rarely    Drug use: No     Review of Systems   Constitutional:  Negative for fever.   HENT:  Negative for sore throat.    Respiratory:  Negative for shortness of breath.    Cardiovascular:  Negative for chest pain.   Gastrointestinal:  Negative for nausea.   Genitourinary:  Negative for dysuria.   Musculoskeletal:  Negative for back pain.   Skin:  Negative for rash.   Neurological:  Negative for weakness.   Hematological:  Does not bruise/bleed easily.   All other systems reviewed and are negative.      Physical Exam     Initial Vitals [10/26/23 2016]   BP Pulse Resp Temp SpO2   (!) 159/119 95 19 98 °F (36.7 °C) 98 %      MAP       --         Physical Exam    Nursing note and vitals reviewed.  Constitutional: He appears well-developed and well-nourished.   HENT:   Head: Normocephalic and atraumatic.   Eyes: EOM are normal. Pupils are equal, round, and reactive to light.   Neck:   Normal range of motion.  Cardiovascular:  Normal rate and regular rhythm.           Pulmonary/Chest: Breath sounds normal. No respiratory distress.   Abdominal: Abdomen is soft.   Musculoskeletal:         General: Normal range of motion.      Cervical back: Normal range of motion.     Neurological: He is alert and oriented to person, place, and time. He has normal strength. GCS score is 15. GCS eye subscore is 4. GCS verbal subscore is 5. GCS motor subscore is 6.   Skin: Skin is warm. Capillary refill takes less than 2 seconds.   Psychiatric: He has a normal mood and affect. Thought content normal.          ED Course   Procedures  Labs Reviewed - No data to display       Imaging Results    None          Medications - No data to display  Medical Decision Making  Differential diagnosis for this patient includes asymptomatic hypertension, hypertensive urgency, hypertensive emergency    Patient is asymptomatic at this time I do not feel a need to do any sort of lab work or other evaluation.  Patient was encouraged to buy his own blood pressure cuff appropriate for his arm size and to keep a blood pressure log of blood pressures in the morning and in the evening.  And to establish care with a primary care provider bringing the blood pressure log with him.  Patient was given a referral to family Medicine and given discharge instructions regarding diet and blood pressure log management.                               Clinical Impression:   Final diagnoses:  [I10] Hypertension, unspecified type (Primary)        ED Disposition Condition    Discharge Stable          ED Prescriptions    None       Follow-up Information    None          Liliana Lam MD  10/27/23 0147

## 2023-10-30 ENCOUNTER — OFFICE VISIT (OUTPATIENT)
Dept: FAMILY MEDICINE | Facility: CLINIC | Age: 28
End: 2023-10-30
Payer: COMMERCIAL

## 2023-10-30 VITALS
HEIGHT: 62 IN | WEIGHT: 166.5 LBS | SYSTOLIC BLOOD PRESSURE: 130 MMHG | DIASTOLIC BLOOD PRESSURE: 80 MMHG | BODY MASS INDEX: 30.64 KG/M2 | RESPIRATION RATE: 16 BRPM

## 2023-10-30 DIAGNOSIS — Z13.220 ENCOUNTER FOR LIPID SCREENING FOR CARDIOVASCULAR DISEASE: ICD-10-CM

## 2023-10-30 DIAGNOSIS — Z13.6 ENCOUNTER FOR LIPID SCREENING FOR CARDIOVASCULAR DISEASE: ICD-10-CM

## 2023-10-30 DIAGNOSIS — I10 HYPERTENSION, UNSPECIFIED TYPE: Primary | ICD-10-CM

## 2023-10-30 PROCEDURE — 3079F PR MOST RECENT DIASTOLIC BLOOD PRESSURE 80-89 MM HG: ICD-10-PCS | Mod: CPTII,S$GLB,, | Performed by: FAMILY MEDICINE

## 2023-10-30 PROCEDURE — 1159F PR MEDICATION LIST DOCUMENTED IN MEDICAL RECORD: ICD-10-PCS | Mod: CPTII,S$GLB,, | Performed by: FAMILY MEDICINE

## 2023-10-30 PROCEDURE — 3008F BODY MASS INDEX DOCD: CPT | Mod: CPTII,S$GLB,, | Performed by: FAMILY MEDICINE

## 2023-10-30 PROCEDURE — 3075F PR MOST RECENT SYSTOLIC BLOOD PRESS GE 130-139MM HG: ICD-10-PCS | Mod: CPTII,S$GLB,, | Performed by: FAMILY MEDICINE

## 2023-10-30 PROCEDURE — 3079F DIAST BP 80-89 MM HG: CPT | Mod: CPTII,S$GLB,, | Performed by: FAMILY MEDICINE

## 2023-10-30 PROCEDURE — 1159F MED LIST DOCD IN RCRD: CPT | Mod: CPTII,S$GLB,, | Performed by: FAMILY MEDICINE

## 2023-10-30 PROCEDURE — 99214 OFFICE O/P EST MOD 30 MIN: CPT | Mod: S$GLB,,, | Performed by: FAMILY MEDICINE

## 2023-10-30 PROCEDURE — 3008F PR BODY MASS INDEX (BMI) DOCUMENTED: ICD-10-PCS | Mod: CPTII,S$GLB,, | Performed by: FAMILY MEDICINE

## 2023-10-30 PROCEDURE — 1160F RVW MEDS BY RX/DR IN RCRD: CPT | Mod: CPTII,S$GLB,, | Performed by: FAMILY MEDICINE

## 2023-10-30 PROCEDURE — 99214 PR OFFICE/OUTPT VISIT, EST, LEVL IV, 30-39 MIN: ICD-10-PCS | Mod: S$GLB,,, | Performed by: FAMILY MEDICINE

## 2023-10-30 PROCEDURE — 1160F PR REVIEW ALL MEDS BY PRESCRIBER/CLIN PHARMACIST DOCUMENTED: ICD-10-PCS | Mod: CPTII,S$GLB,, | Performed by: FAMILY MEDICINE

## 2023-10-30 PROCEDURE — 99999 PR PBB SHADOW E&M-EST. PATIENT-LVL IV: ICD-10-PCS | Mod: PBBFAC,,, | Performed by: FAMILY MEDICINE

## 2023-10-30 PROCEDURE — 3075F SYST BP GE 130 - 139MM HG: CPT | Mod: CPTII,S$GLB,, | Performed by: FAMILY MEDICINE

## 2023-10-30 PROCEDURE — 99999 PR PBB SHADOW E&M-EST. PATIENT-LVL IV: CPT | Mod: PBBFAC,,, | Performed by: FAMILY MEDICINE

## 2023-10-30 RX ORDER — LOSARTAN POTASSIUM 25 MG/1
25 TABLET ORAL DAILY
Qty: 90 TABLET | Refills: 3 | Status: SHIPPED | OUTPATIENT
Start: 2023-10-30 | End: 2024-01-25 | Stop reason: SDUPTHER

## 2023-10-30 NOTE — PATIENT INSTRUCTIONS
Thank you for allowing me to participate in your care today. It is an honor to be a part of your healthcare team at [facility]. If you had labs ordered today, you will receive notification via [employer], phone call or mailed letter regarding your results within 7 days. If you have any questions or concerns regarding your visit today, please do not hesitate to contact us.  Sincerely,   Lo Gu M.D.

## 2023-10-30 NOTE — PROGRESS NOTES
Subjective:       Patient ID: Kalin Sidhu IV is a 28 y.o. male.    Chief Complaint: Establish Care (Patient states he's here to establish care. He has a home blood pressure machine and constantly says it's high. He went to ED last week with 168/120 bp. He brought his machine in today to compare. )    Presents today to establish care. Blood pressure has been elevated lately.   Was seen in ED.   BP Readings from Last 3 Encounters:  10/30/23 : (!) 152/84  10/26/23 : (!) 159/119  05/13/23 : (!) 141/89            Review of Systems   Constitutional:  Negative for activity change, appetite change, fatigue and fever.   Respiratory:  Negative for shortness of breath.    Gastrointestinal:  Negative for abdominal pain.   Integumentary:  Negative for rash.         Objective:      Physical Exam  Vitals and nursing note reviewed.   Constitutional:       General: He is not in acute distress.     Appearance: He is not ill-appearing.   Cardiovascular:      Rate and Rhythm: Normal rate and regular rhythm.      Heart sounds: No murmur heard.  Pulmonary:      Effort: Pulmonary effort is normal.      Breath sounds: Normal breath sounds. No wheezing.   Skin:     General: Skin is warm and dry.      Findings: No rash.   Neurological:      Mental Status: He is alert.   Psychiatric:         Mood and Affect: Mood normal.         Behavior: Behavior normal.         Assessment:       1. Hypertension, unspecified type    2. Encounter for lipid screening for cardiovascular disease        Plan:       Problem List Items Addressed This Visit          Cardiac/Vascular    Hypertension - Primary     New problem. Starting treatment with low dose losartan.          Relevant Medications    losartan (COZAAR) 25 MG tablet    Other Relevant Orders    Comprehensive Metabolic Panel    CBC Auto Differential    TSH     Other Visit Diagnoses       Encounter for lipid screening for cardiovascular disease        Relevant Orders    Lipid Panel

## 2023-11-03 ENCOUNTER — LAB VISIT (OUTPATIENT)
Dept: LAB | Facility: HOSPITAL | Age: 28
End: 2023-11-03
Attending: FAMILY MEDICINE
Payer: COMMERCIAL

## 2023-11-03 DIAGNOSIS — I10 HYPERTENSION, UNSPECIFIED TYPE: ICD-10-CM

## 2023-11-03 DIAGNOSIS — Z13.220 ENCOUNTER FOR LIPID SCREENING FOR CARDIOVASCULAR DISEASE: ICD-10-CM

## 2023-11-03 DIAGNOSIS — Z13.6 ENCOUNTER FOR LIPID SCREENING FOR CARDIOVASCULAR DISEASE: ICD-10-CM

## 2023-11-03 LAB
ALBUMIN SERPL BCP-MCNC: 4 G/DL (ref 3.5–5.2)
ALP SERPL-CCNC: 79 U/L (ref 55–135)
ALT SERPL W/O P-5'-P-CCNC: 58 U/L (ref 10–44)
ANION GAP SERPL CALC-SCNC: 12 MMOL/L (ref 8–16)
AST SERPL-CCNC: 32 U/L (ref 10–40)
BASOPHILS # BLD AUTO: 0.09 K/UL (ref 0–0.2)
BASOPHILS NFR BLD: 1 % (ref 0–1.9)
BILIRUB SERPL-MCNC: 0.7 MG/DL (ref 0.1–1)
BUN SERPL-MCNC: 15 MG/DL (ref 6–20)
CALCIUM SERPL-MCNC: 9.3 MG/DL (ref 8.7–10.5)
CHLORIDE SERPL-SCNC: 102 MMOL/L (ref 95–110)
CHOLEST SERPL-MCNC: 157 MG/DL (ref 120–199)
CHOLEST/HDLC SERPL: 5.2 {RATIO} (ref 2–5)
CO2 SERPL-SCNC: 23 MMOL/L (ref 23–29)
CREAT SERPL-MCNC: 0.8 MG/DL (ref 0.5–1.4)
DIFFERENTIAL METHOD: ABNORMAL
EOSINOPHIL # BLD AUTO: 0.6 K/UL (ref 0–0.5)
EOSINOPHIL NFR BLD: 6.4 % (ref 0–8)
ERYTHROCYTE [DISTWIDTH] IN BLOOD BY AUTOMATED COUNT: 11.9 % (ref 11.5–14.5)
EST. GFR  (NO RACE VARIABLE): >60 ML/MIN/1.73 M^2
GLUCOSE SERPL-MCNC: 88 MG/DL (ref 70–110)
HCT VFR BLD AUTO: 44.9 % (ref 40–54)
HDLC SERPL-MCNC: 30 MG/DL (ref 40–75)
HDLC SERPL: 19.1 % (ref 20–50)
HGB BLD-MCNC: 15.4 G/DL (ref 14–18)
IMM GRANULOCYTES # BLD AUTO: 0.04 K/UL (ref 0–0.04)
IMM GRANULOCYTES NFR BLD AUTO: 0.4 % (ref 0–0.5)
LDLC SERPL CALC-MCNC: 98.2 MG/DL (ref 63–159)
LYMPHOCYTES # BLD AUTO: 3.2 K/UL (ref 1–4.8)
LYMPHOCYTES NFR BLD: 34.3 % (ref 18–48)
MCH RBC QN AUTO: 30.1 PG (ref 27–31)
MCHC RBC AUTO-ENTMCNC: 34.3 G/DL (ref 32–36)
MCV RBC AUTO: 88 FL (ref 82–98)
MONOCYTES # BLD AUTO: 0.8 K/UL (ref 0.3–1)
MONOCYTES NFR BLD: 8.4 % (ref 4–15)
NEUTROPHILS # BLD AUTO: 4.6 K/UL (ref 1.8–7.7)
NEUTROPHILS NFR BLD: 49.5 % (ref 38–73)
NONHDLC SERPL-MCNC: 127 MG/DL
NRBC BLD-RTO: 0 /100 WBC
PLATELET # BLD AUTO: 409 K/UL (ref 150–450)
PMV BLD AUTO: 9.1 FL (ref 9.2–12.9)
POTASSIUM SERPL-SCNC: 4.1 MMOL/L (ref 3.5–5.1)
PROT SERPL-MCNC: 7.8 G/DL (ref 6–8.4)
RBC # BLD AUTO: 5.12 M/UL (ref 4.6–6.2)
SODIUM SERPL-SCNC: 137 MMOL/L (ref 136–145)
TRIGL SERPL-MCNC: 144 MG/DL (ref 30–150)
TSH SERPL DL<=0.005 MIU/L-ACNC: 0.73 UIU/ML (ref 0.4–4)
WBC # BLD AUTO: 9.35 K/UL (ref 3.9–12.7)

## 2023-11-03 PROCEDURE — 80061 LIPID PANEL: CPT | Performed by: FAMILY MEDICINE

## 2023-11-03 PROCEDURE — 80053 COMPREHEN METABOLIC PANEL: CPT | Performed by: FAMILY MEDICINE

## 2023-11-03 PROCEDURE — 84443 ASSAY THYROID STIM HORMONE: CPT | Performed by: FAMILY MEDICINE

## 2023-11-03 PROCEDURE — 36415 COLL VENOUS BLD VENIPUNCTURE: CPT | Performed by: FAMILY MEDICINE

## 2023-11-03 PROCEDURE — 85025 COMPLETE CBC W/AUTO DIFF WBC: CPT | Performed by: FAMILY MEDICINE

## 2023-11-13 ENCOUNTER — CLINICAL SUPPORT (OUTPATIENT)
Dept: FAMILY MEDICINE | Facility: CLINIC | Age: 28
End: 2023-11-13
Payer: COMMERCIAL

## 2023-11-13 ENCOUNTER — PATIENT MESSAGE (OUTPATIENT)
Dept: FAMILY MEDICINE | Facility: CLINIC | Age: 28
End: 2023-11-13

## 2023-11-13 VITALS — SYSTOLIC BLOOD PRESSURE: 130 MMHG | HEART RATE: 85 BPM | DIASTOLIC BLOOD PRESSURE: 82 MMHG

## 2023-11-13 DIAGNOSIS — I10 HYPERTENSION, UNSPECIFIED TYPE: Primary | ICD-10-CM

## 2023-11-13 PROCEDURE — 99999 PR PBB SHADOW E&M-EST. PATIENT-LVL II: ICD-10-PCS | Mod: PBBFAC,,,

## 2023-11-13 PROCEDURE — 99999 PR PBB SHADOW E&M-EST. PATIENT-LVL II: CPT | Mod: PBBFAC,,,

## 2023-11-13 NOTE — PROGRESS NOTES
Kalin Sidhu IV 28 y.o. male is here today for Blood Pressure check.   History of HTN yes.    Review of patient's allergies indicates:  No Known Allergies  Creatinine   Date Value Ref Range Status   11/03/2023 0.8 0.5 - 1.4 mg/dL Final     Sodium   Date Value Ref Range Status   11/03/2023 137 136 - 145 mmol/L Final     Potassium   Date Value Ref Range Status   11/03/2023 4.1 3.5 - 5.1 mmol/L Final   ]  Patient verifies taking blood pressure medications on a regular basis at the same time of the day.     Current Outpatient Medications:     losartan (COZAAR) 25 MG tablet, Take 1 tablet (25 mg total) by mouth once daily., Disp: 90 tablet, Rfl: 3  Does patient have record of home blood pressure readings yes. BP log sent through my chart.   Last dose of blood pressure medication was taken at this morning.  Patient is asymptomatic.       BP: 130/82 , Pulse: 85 .      Dr. Gu notified.

## 2024-01-25 DIAGNOSIS — I10 HYPERTENSION, UNSPECIFIED TYPE: ICD-10-CM

## 2024-01-26 RX ORDER — LOSARTAN POTASSIUM 25 MG/1
25 TABLET ORAL DAILY
Qty: 90 TABLET | Refills: 3 | Status: SHIPPED | OUTPATIENT
Start: 2024-01-26

## 2024-01-26 NOTE — TELEPHONE ENCOUNTER
Refill Routing Note   Medication(s) are not appropriate for processing by [facility] for the following reason(s):        New or recently adjusted medication    [facility] action(s):  Defer               Appointments  past 12m or future 3m with PCP    Date Provider   Last Visit   10/30/2023 Lo Gu MD   Next Visit   Visit date not found Lo Gu MD   ED visits in past 90 days: 0        Note composed:9:12 AM 01/26/2024

## 2024-01-26 NOTE — TELEPHONE ENCOUNTER
No care due was identified.  [facility] Embedded Care Due Messages. Reference number: [id number].   1/25/2024 10:25:44 PM CST

## 2024-11-11 DIAGNOSIS — I10 HYPERTENSION: ICD-10-CM

## 2024-11-12 ENCOUNTER — PATIENT MESSAGE (OUTPATIENT)
Dept: ADMINISTRATIVE | Facility: HOSPITAL | Age: 29
End: 2024-11-12
Payer: COMMERCIAL

## 2024-11-20 ENCOUNTER — PATIENT MESSAGE (OUTPATIENT)
Dept: FAMILY MEDICINE | Facility: CLINIC | Age: 29
End: 2024-11-20
Payer: OTHER MISCELLANEOUS

## 2024-11-20 DIAGNOSIS — Z13.6 ENCOUNTER FOR LIPID SCREENING FOR CARDIOVASCULAR DISEASE: ICD-10-CM

## 2024-11-20 DIAGNOSIS — Z13.220 ENCOUNTER FOR LIPID SCREENING FOR CARDIOVASCULAR DISEASE: ICD-10-CM

## 2024-11-20 DIAGNOSIS — I10 HYPERTENSION, UNSPECIFIED TYPE: Primary | ICD-10-CM

## 2024-11-21 VITALS — SYSTOLIC BLOOD PRESSURE: 128 MMHG | DIASTOLIC BLOOD PRESSURE: 79 MMHG

## 2024-11-22 ENCOUNTER — LAB VISIT (OUTPATIENT)
Dept: LAB | Facility: HOSPITAL | Age: 29
End: 2024-11-22
Attending: FAMILY MEDICINE
Payer: COMMERCIAL

## 2024-11-22 ENCOUNTER — OFFICE VISIT (OUTPATIENT)
Dept: FAMILY MEDICINE | Facility: CLINIC | Age: 29
End: 2024-11-22
Payer: COMMERCIAL

## 2024-11-22 VITALS
RESPIRATION RATE: 18 BRPM | HEIGHT: 62 IN | BODY MASS INDEX: 31.47 KG/M2 | DIASTOLIC BLOOD PRESSURE: 88 MMHG | OXYGEN SATURATION: 95 % | HEART RATE: 87 BPM | WEIGHT: 171 LBS | SYSTOLIC BLOOD PRESSURE: 138 MMHG

## 2024-11-22 DIAGNOSIS — Z00.00 ROUTINE GENERAL MEDICAL EXAMINATION AT A HEALTH CARE FACILITY: Primary | ICD-10-CM

## 2024-11-22 DIAGNOSIS — I10 PRIMARY HYPERTENSION: ICD-10-CM

## 2024-11-22 DIAGNOSIS — I10 HYPERTENSION, UNSPECIFIED TYPE: ICD-10-CM

## 2024-11-22 DIAGNOSIS — M19.071 ARTHRITIS OF ANKLE, RIGHT: ICD-10-CM

## 2024-11-22 DIAGNOSIS — Z13.6 ENCOUNTER FOR LIPID SCREENING FOR CARDIOVASCULAR DISEASE: ICD-10-CM

## 2024-11-22 DIAGNOSIS — Z13.220 ENCOUNTER FOR LIPID SCREENING FOR CARDIOVASCULAR DISEASE: ICD-10-CM

## 2024-11-22 PROBLEM — S61.319A NAILBED LACERATION, FINGER, INITIAL ENCOUNTER: Status: RESOLVED | Noted: 2023-05-13 | Resolved: 2024-11-22

## 2024-11-22 PROBLEM — S62.630B OPEN DISPLACED FRACTURE OF DISTAL PHALANX OF RIGHT INDEX FINGER: Status: RESOLVED | Noted: 2023-05-13 | Resolved: 2024-11-22

## 2024-11-22 LAB
ALBUMIN SERPL BCP-MCNC: 3.9 G/DL (ref 3.5–5.2)
ALP SERPL-CCNC: 70 U/L (ref 40–150)
ALT SERPL W/O P-5'-P-CCNC: 48 U/L (ref 10–44)
ANION GAP SERPL CALC-SCNC: 11 MMOL/L (ref 8–16)
AST SERPL-CCNC: 37 U/L (ref 10–40)
BASOPHILS # BLD AUTO: 0.14 K/UL (ref 0–0.2)
BASOPHILS NFR BLD: 1.7 % (ref 0–1.9)
BILIRUB SERPL-MCNC: 0.4 MG/DL (ref 0.1–1)
BUN SERPL-MCNC: 14 MG/DL (ref 6–20)
CALCIUM SERPL-MCNC: 9.1 MG/DL (ref 8.7–10.5)
CHLORIDE SERPL-SCNC: 105 MMOL/L (ref 95–110)
CHOLEST SERPL-MCNC: 156 MG/DL (ref 120–199)
CHOLEST/HDLC SERPL: 5.2 {RATIO} (ref 2–5)
CO2 SERPL-SCNC: 21 MMOL/L (ref 23–29)
CREAT SERPL-MCNC: 0.7 MG/DL (ref 0.5–1.4)
DIFFERENTIAL METHOD BLD: ABNORMAL
EOSINOPHIL # BLD AUTO: 0.6 K/UL (ref 0–0.5)
EOSINOPHIL NFR BLD: 6.7 % (ref 0–8)
ERYTHROCYTE [DISTWIDTH] IN BLOOD BY AUTOMATED COUNT: 12 % (ref 11.5–14.5)
EST. GFR  (NO RACE VARIABLE): >60 ML/MIN/1.73 M^2
GLUCOSE SERPL-MCNC: 103 MG/DL (ref 70–110)
HCT VFR BLD AUTO: 44.3 % (ref 40–54)
HDLC SERPL-MCNC: 30 MG/DL (ref 40–75)
HDLC SERPL: 19.2 % (ref 20–50)
HGB BLD-MCNC: 15.4 G/DL (ref 14–18)
IMM GRANULOCYTES # BLD AUTO: 0.05 K/UL (ref 0–0.04)
IMM GRANULOCYTES NFR BLD AUTO: 0.6 % (ref 0–0.5)
LDLC SERPL CALC-MCNC: 98 MG/DL (ref 63–159)
LYMPHOCYTES # BLD AUTO: 3.2 K/UL (ref 1–4.8)
LYMPHOCYTES NFR BLD: 38.3 % (ref 18–48)
MCH RBC QN AUTO: 30.3 PG (ref 27–31)
MCHC RBC AUTO-ENTMCNC: 34.8 G/DL (ref 32–36)
MCV RBC AUTO: 87 FL (ref 82–98)
MONOCYTES # BLD AUTO: 0.7 K/UL (ref 0.3–1)
MONOCYTES NFR BLD: 8.4 % (ref 4–15)
NEUTROPHILS # BLD AUTO: 3.7 K/UL (ref 1.8–7.7)
NEUTROPHILS NFR BLD: 44.3 % (ref 38–73)
NONHDLC SERPL-MCNC: 126 MG/DL
NRBC BLD-RTO: 0 /100 WBC
PLATELET # BLD AUTO: 408 K/UL (ref 150–450)
PMV BLD AUTO: 9.2 FL (ref 9.2–12.9)
POTASSIUM SERPL-SCNC: 4.2 MMOL/L (ref 3.5–5.1)
PROT SERPL-MCNC: 7.6 G/DL (ref 6–8.4)
RBC # BLD AUTO: 5.09 M/UL (ref 4.6–6.2)
SODIUM SERPL-SCNC: 137 MMOL/L (ref 136–145)
TRIGL SERPL-MCNC: 140 MG/DL (ref 30–150)
TSH SERPL DL<=0.005 MIU/L-ACNC: 0.81 UIU/ML (ref 0.4–4)
WBC # BLD AUTO: 8.32 K/UL (ref 3.9–12.7)

## 2024-11-22 PROCEDURE — 80053 COMPREHEN METABOLIC PANEL: CPT | Performed by: FAMILY MEDICINE

## 2024-11-22 PROCEDURE — 36415 COLL VENOUS BLD VENIPUNCTURE: CPT | Performed by: FAMILY MEDICINE

## 2024-11-22 PROCEDURE — 99999 PR PBB SHADOW E&M-EST. PATIENT-LVL III: CPT | Mod: PBBFAC,,, | Performed by: FAMILY MEDICINE

## 2024-11-22 PROCEDURE — 84443 ASSAY THYROID STIM HORMONE: CPT | Performed by: FAMILY MEDICINE

## 2024-11-22 PROCEDURE — 85025 COMPLETE CBC W/AUTO DIFF WBC: CPT | Performed by: FAMILY MEDICINE

## 2024-11-22 PROCEDURE — 80061 LIPID PANEL: CPT | Performed by: FAMILY MEDICINE

## 2024-11-22 RX ORDER — LOSARTAN POTASSIUM 25 MG/1
25 TABLET ORAL DAILY
Qty: 90 TABLET | Refills: 3 | Status: SHIPPED | OUTPATIENT
Start: 2024-11-22

## 2024-11-22 NOTE — PROGRESS NOTES
"  Subjective:       Patient ID: Kalin Sidhu IV is a 29 y.o. male.    Chief Complaint: Annual Exam    History of Present Illness    CHIEF COMPLAINT:  Mr. Sidhu presents today for annual wellness visit.    HYPERTENSION:  He reports blood pressures have been running slightly high, more often high than low. He acknowledges not checking his blood pressure as frequently as recommended. He takes readings primarily at night due to irregular work shifts. He feels his current low-dose medication might sometimes be too much.    RESPIRATORY:  He reports experiencing a mild cough, which he attributes to the current season. He denies any other new health issues.    MUSCULOSKELETAL:  He reports ongoing arthritis affecting his ankle and fingers, exacerbated by cold weather. He describes the joint issues as part of "everyday life". He denies taking any medication for joint pain, tolerating the discomfort as it is not severe. He acknowledges the presence of surgical hardware in his ankle, which may contribute to his symptoms.    WEIGHT MANAGEMENT:  He reports current weight of 171 lbs, an increase of 5 lbs since his last visit. He is planning to start a carnivore diet for weight management, inspired by a family member's success.    LIVER FUNCTION:  He reports his previous liver function test showed elevated numbers, but notes that his liver number has dropped 30 points since the last check. He denies being a big alcohol consumer, stating he only drinks occasionally and not excessively. He expresses confusion about the high liver numbers given his limited alcohol intake.      ROS:  General: +weight gain  Respiratory: +cough  Musculoskeletal: +joint pain             Patient Active Problem List   Diagnosis    Arthritis of ankle, right    Hypertension     Kalin has a current medication list which includes the following prescription(s): losartan.        Health Maintenance Due   Topic Date Due    Hepatitis C Screening  " "Never done    COVID-19 Vaccine (3 - 2024-25 season) 09/01/2024      Health Maintenance reviewed and discussed- Does not desire flu shot   Objective:      Vitals:    11/22/24 0816 11/22/24 0847   BP: (!) 138/94 138/88   Pulse: 87    Resp: 18    SpO2: 95%    Weight: 77.6 kg (171 lb)    Height: 5' 2" (1.575 m)    PainSc: 0-No pain      Body mass index is 31.28 kg/m².  Physical Exam  Vitals and nursing note reviewed.   Constitutional:       General: He is not in acute distress.     Appearance: He is not ill-appearing.   Cardiovascular:      Rate and Rhythm: Normal rate and regular rhythm.      Heart sounds: No murmur heard.  Pulmonary:      Effort: Pulmonary effort is normal.      Breath sounds: Normal breath sounds. No wheezing.   Skin:     General: Skin is warm and dry.      Findings: No rash.   Neurological:      Mental Status: He is alert.   Psychiatric:         Mood and Affect: Mood normal.         Behavior: Behavior normal.               Assessment:       Assessment & Plan    1. Assessed patient's blood pressure control on current medication regimen  2. Considered increasing losartan dose to 50mg for better BP control, but decided against it due to patient's occasional reports of feeling overmedicated  3. Evaluated patient's weight and discussed potential weight loss strategies  4. Considered prescribing Wegovy (weight loss version of Ozempic) pending insurance coverage and prior authorization  5. Noted improvement in liver function tests, dropping 30 points since last visit  6. Considered fatty liver as a potential cause for elevated liver enzymes           Plan:       1. Routine general medical examination at a health care facility  Comments:  Annual well visit today. Labs ordered and he will do those before he leaves today    2. Primary hypertension  Assessment & Plan:  Fair control. Takes medication at night. Works weird shifts so does not always get to take his medication like he should. Usually feels well " but at times will feel low after taking the medication. Will continue ambulatory monitoring and 25mg daily of losartan.       3. Arthritis of ankle, right  Assessment & Plan:  Chronic arthritis  Does not take anything for it as he is able to tolerate the discomfort.       4. Hypertension, unspecified type  Assessment & Plan:  Fair control. Takes medication at night. Works weird shifts so does not always get to take his medication like he should. Usually feels well but at times will feel low after taking the medication. Will continue ambulatory monitoring and 25mg daily of losartan.     Orders:  -     losartan (COZAAR) 25 MG tablet; Take 1 tablet (25 mg total) by mouth once daily.  Dispense: 90 tablet; Refill: 3         This note was generated with the assistance of ambient listening technology. Verbal consent was obtained by the patient and accompanying visitor(s) for the recording of patient appointment to facilitate this note. I attest to having reviewed and edited the generated note for accuracy, though some syntax or spelling errors may persist. Please contact the author of this note for any clarification.

## 2024-11-22 NOTE — ASSESSMENT & PLAN NOTE
Fair control. Takes medication at night. Works weird shifts so does not always get to take his medication like he should. Usually feels well but at times will feel low after taking the medication. Will continue ambulatory monitoring and 25mg daily of losartan.

## 2024-11-27 DIAGNOSIS — I10 HYPERTENSION, UNSPECIFIED TYPE: ICD-10-CM

## 2024-11-27 RX ORDER — LOSARTAN POTASSIUM 25 MG/1
25 TABLET ORAL DAILY
Qty: 90 TABLET | Refills: 3 | OUTPATIENT
Start: 2024-11-27

## 2024-11-27 NOTE — TELEPHONE ENCOUNTER
Refill Decision Note  Quick DC. Request already responded to by other means (e.g. phone or fax)    Kalin Brinkdin  is requesting a refill authorization.  Brief Assessment and Rationale for Refill:  Quick Discontinue     Medication Therapy Plan:       Medication Reconciliation Completed: No   Comments:           Note composed:1:30 PM 11/27/2024

## 2024-11-27 NOTE — TELEPHONE ENCOUNTER
No care due was identified.  [facility] Embedded Care Due Messages. Reference number: [id number].   11/27/2024 9:40:29 AM CST

## 2025-05-27 ENCOUNTER — HOSPITAL ENCOUNTER (EMERGENCY)
Facility: HOSPITAL | Age: 30
Discharge: HOME | End: 2025-05-27
Attending: STUDENT IN AN ORGANIZED HEALTH CARE EDUCATION/TRAINING PROGRAM
Payer: COMMERCIAL

## 2025-05-27 VITALS
SYSTOLIC BLOOD PRESSURE: 154 MMHG | BODY MASS INDEX: 31.28 KG/M2 | HEART RATE: 109 BPM | WEIGHT: 170 LBS | TEMPERATURE: 98 F | DIASTOLIC BLOOD PRESSURE: 94 MMHG | HEIGHT: 62 IN | OXYGEN SATURATION: 98 % | RESPIRATION RATE: 100 BRPM

## 2025-05-27 DIAGNOSIS — Z86.79 HISTORY OF HYPERTENSION: ICD-10-CM

## 2025-05-27 DIAGNOSIS — S61.452A DOG BITE OF LEFT HAND, INITIAL ENCOUNTER: Primary | ICD-10-CM
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
## 2025-05-27 PROCEDURE — 99283 EMERGENCY DEPT VISIT LOW MDM: CPT

## 2025-05-27 PROCEDURE — 25000003 PHARM REV CODE 250: Performed by: STUDENT IN AN ORGANIZED HEALTH CARE EDUCATION/TRAINING PROGRAM

## 2025-05-27 RX ORDER — AMOXICILLIN AND CLAVULANATE POTASSIUM 875; 125 MG/1; MG/1
1 TABLET, FILM COATED ORAL
Status: COMPLETED | OUTPATIENT
Start: 2025-05-27 | End: 2025-05-27

## 2025-05-27 RX ORDER — AMOXICILLIN AND CLAVULANATE POTASSIUM 875; 125 MG/1; MG/1
1 TABLET, FILM COATED ORAL 2 TIMES DAILY
Qty: 14 TABLET | Refills: 0 | Status: SHIPPED | OUTPATIENT
Start: 2025-05-27

## 2025-05-27 RX ADMIN — AMOXICILLIN AND CLAVULANATE POTASSIUM 1 TABLET: 875; 125 TABLET, FILM COATED ORAL at 10:05

## 2025-05-28 NOTE — DISCHARGE INSTRUCTIONS
Discharge Directions:    Dear Kalin Sidhu IV,  Thank you for choosing [facility] for your emergency care. It was my pleasure treating you today.      You have been diagnosed with dog bite to left hand.  Please make sure to keep it clean with soap and water, you were given antibiotics     Please follow up with primary care       Please see your primary care physician in 2-3 days or whenever you are able to schedule a follow-up appointment. Your evaluation in the Emergency Department is focused and limited to evaluating life threatening conditions. Continuing to see your primary care physician is a critical step in maintaining good health now and in the future.      Please return to the emergency department if your condition worsens in any way. Your condition may change over time, and our evaluation in the ED today is an evaluation of your condition at one moment in time. If there is any worsening in your condition or new concerning symptoms develop, please follow up as soon as possible with your regular doctor or return to the ER immediately.     Sincerely,     Sylvain Joshua M.D.

## 2025-05-28 NOTE — ED PROVIDER NOTES
CHIEF COMPLAINT   Chief Complaint   Patient presents with    Animal Bite     Pt presents after dog bite happening yesterday.  Pt was clipping his dogs nails when she bit his left hand.  Small puncture to posterior hand and between 4th and 5th finger. Dog is UTD on vaccines.  Slight swelling and redness to posterior hand.        HPI   Kalin Sidhu IV is a 30 y.o. male who presents with dog bite to left hand now complicated with some swelling redness and pain.  Patient reports his dog who is vaccinated BTM accidentally on the left hand.  Had 2 small puncture wounds.  He has been keeping the wound clean with peroxide but his symptoms not getting better.  He denies discharge, decreased range of motion.    History source chart review, the patient, and significant other      OTHER RECORDS REVIEWED  Patient sees primary care [facility] outpatient basis the left visit March 27th of this year for pain management of hypertension     CURRENT MEDICATIONS   Previous Medications    LOSARTAN (COZAAR) 25 MG TABLET    Take 1 tablet (25 mg total) by mouth once daily.        ALLERGIES   Review of patient's allergies indicates:  No Known Allergies     PAST MEDICAL HISTORY  Past Medical History:   Diagnosis Date    Hypertension     Nailbed laceration, finger, initial encounter 05/13/2023    Open displaced fracture of distal phalanx of right index finger 05/13/2023        SURGICAL HISTORY   Past Surgical History:   Procedure Laterality Date    ANKLE FRACTURE SURGERY  9/2014    Rt ankle    APPLICATION OF SPLINT Right 5/13/2023    Procedure: APPLICATION, SPLINT;  Surgeon: Jimmie Flannery MD;  Location: [facility] OR;  Service: Orthopedics;  Laterality: Right;    IRRIGATION AND DEBRIDEMENT OF UPPER EXTREMITY Right 5/13/2023    Procedure: IRRIGATION AND DEBRIDEMENT, index finger;  Surgeon: Jimmie Flannery MD;  Location: [facility] OR;  Service: Orthopedics;  Laterality: Right;    PERCUTANEOUS PINNING OF FINGER Right 5/13/2023     Procedure: PINNING, FINGER, open distal phalanx fracture- right index finger;  Surgeon: Jimmie Flannery MD;  Location: [facility] OR;  Service: Orthopedics;  Laterality: Right;    REPAIR OF LACERATION Right 5/13/2023    Procedure: REPAIR, LACERATION- right index finger;  Surgeon: Jimmie Flannery MD;  Location: [facility] OR;  Service: Orthopedics;  Laterality: Right;    REPAIR OF NAIL BED  5/13/2023    Procedure: REPAIR, NAIL BED- right index finger;  Surgeon: Jimmie Flannery MD;  Location: [facility] OR;  Service: Orthopedics;;        SOCIAL HISTORY   Social History     Socioeconomic History    Marital status: Single   Tobacco Use    Smoking status: Never    Smokeless tobacco: Current     Types: Snuff    Tobacco comments:     Dip tabacco   Substance and Sexual Activity    Alcohol use: No     Comment: rarely    Drug use: No     Social Drivers of Health     Financial Resource Strain: Low Risk  (11/16/2024)    Overall Financial Resource Strain (CARDIA)     Difficulty of Paying Living Expenses: Not hard at all   Food Insecurity: No Food Insecurity (11/16/2024)    Hunger Vital Sign     Worried About Running Out of Food in the Last Year: Never true     Ran Out of Food in the Last Year: Never true   Physical Activity: Sufficiently Active (11/16/2024)    Exercise Vital Sign     Days of Exercise per Week: 4 days     Minutes of Exercise per Session: 150+ min   Stress: No Stress Concern Present (11/16/2024)    [employer] - Occupational Stress Questionnaire     Feeling of Stress : Not at all   Housing Stability: Unknown (11/16/2024)    Housing Stability Vital Sign     Unable to Pay for Housing in the Last Year: No        FAMILY HISTORY   Family History   Problem Relation Name Age of Onset    No Known Problems Mother      No Known Problems Father      Collagen disease Neg Hx            REVIEW OF SYSTEMS   Review of Systems   Constitutional:  Negative for chills and fever.   HENT:  Negative for  "congestion, hearing loss and sore throat.    Eyes:  Negative for blurred vision and double vision.   Respiratory:  Negative for cough, shortness of breath and wheezing.    Cardiovascular:  Negative for chest pain, palpitations and leg swelling.   Gastrointestinal:  Negative for abdominal pain, constipation, diarrhea, nausea and vomiting.   Genitourinary:  Negative for dysuria, frequency and hematuria.   Musculoskeletal:  Negative for joint pain and myalgias.        Left hand swelling and redness at the site of bug bite   Skin:  Negative for rash.   Neurological:  Negative for dizziness, sensory change, focal weakness, weakness and headaches.        PHYSICAL EXAM   VITAL SIGNS: BP (!) 162/100   Pulse 109   Temp 98.2 °F (36.8 °C) (Oral)   Resp 18   Ht 5' 2" (1.575 m)   Wt 77.1 kg (170 lb)   SpO2 98%   BMI 31.09 kg/m²     Physical Exam  Vitals and nursing note reviewed.   Constitutional:       General: He is not in acute distress.     Appearance: Normal appearance. He is obese. He is not ill-appearing, toxic-appearing or diaphoretic.   HENT:      Head: Normocephalic and atraumatic.      Right Ear: External ear normal.      Left Ear: External ear normal.      Nose: Nose normal.   Eyes:      Extraocular Movements: Extraocular movements intact.      Conjunctiva/sclera: Conjunctivae normal.      Pupils: Pupils are equal, round, and reactive to light.   Cardiovascular:      Rate and Rhythm: Normal rate and regular rhythm.      Pulses: Normal pulses.      Heart sounds: Normal heart sounds.   Pulmonary:      Effort: Pulmonary effort is normal. No respiratory distress.      Breath sounds: Normal breath sounds. No stridor. No wheezing, rhonchi or rales.   Abdominal:      General: Abdomen is flat. There is no distension.      Palpations: Abdomen is soft. There is no mass.      Tenderness: There is no abdominal tenderness. There is no right CVA tenderness, left CVA tenderness, guarding or rebound.      Hernia: No hernia is " present.   Musculoskeletal:         General: Swelling, tenderness and signs of injury present. No deformity. Normal range of motion.      Cervical back: Normal range of motion and neck supple.      Right lower leg: No edema.      Left lower leg: No edema.      Comments: Mild swelling with erythema over the dorsal side of left hand between the 4th 5th digit as well as between the 1st 2nd digit, small puncture wound without discharge.  Full range of motion and neurovascularly intact   Skin:     General: Skin is dry.      Coloration: Skin is not jaundiced or pale.      Findings: No bruising, erythema, lesion or rash.   Neurological:      General: No focal deficit present.      Mental Status: He is alert and oriented to person, place, and time. Mental status is at baseline.      Cranial Nerves: No cranial nerve deficit.      Sensory: No sensory deficit.      Motor: No weakness.      Coordination: Coordination normal.      Gait: Gait normal.   Psychiatric:         Mood and Affect: Mood normal.         Behavior: Behavior normal.         Thought Content: Thought content normal.         Judgment: Judgment normal.                ED COURSE & MEDICAL DECISION MAKING  Kalin Sidhu IV is a 30 y.o. male with pertinent PMH of hypertension presents with dog bite to left hand concerning for cellulitis abscess tenosynovitis fracture dislocation or other pathology?.     Vitals: Normotensive, regular rate, afebrile, saturating well on room air?  Exam: As above? ?  Initial Workup:  None ?  Initial Therapy:  Augmentin    EKG:  Deferred  ED TIMELINE  ED Course as of 05/27/25 2226 Tue May 27, 2025   2223 Small puncture wounds and a draining, full range of motion with no concern for tenosynovitis or retained foreign body.    Patient's dog is vaccinated.  No concern for rabies.    We will go ahead and started with Augmentin here and provided prescription [IL]      ED Course User Index  [IL] Sylvain Joshua MD       Imaging  Results    None          LABS (CONSIDERED BUT NOT PERFORMED)  CBC CMP ESR CRP      RADIOLOGY (CONSIDERED BUT NOT PERFORMED)  Left hand x-ray     RADIOLOGICAL STUDIES INDEPENDENTLY INTERPRETED FOR INITIATING CLINICAL CARE BEFORE THE OFFICIAL RADIOLOGICAL READ WAS AVAILABLE  See ED timeline      PROCEDURE  Procedures  None     ED Medactions Given  Medications - No data to display  Treatment considered but not performed:  Opioids     CRITICAL CARE  None      DISPOSITION  Hospitalization was considered, but after discussion with the patient and care giver and joint decision was made to trial discharge with strict return precautions. Patient underwent testing and reevaluation as listed above and it was determined no emergent intervention is further required. Patient was consulted on daily habits that can be changed to improve health. Patient will follow up with primary care that he/she has .       Prescriptions for D/C - see below.  Prescriptions considered but not written:  Azithromycin.    Patient is given prescription for Augmentin      At this time, I feel that patient has reached maximum benefit from ED workup, management and observation. Discussed results of labs/imaging with the patient and significant other  and updated them on plan of action, they voiced agreement and understanding. I also informed them that our evaluation in the emergency department today is an evaluation of his condition in one moment in time Patient encouraged to return to the emergency department for fever/chills, shortness of breath, chest pain, inability to tolerate food or water, new numbness/weakness/tinlging or any new/worsening/concerning symptoms. Encouraged follow up with a primary care provider for symptom recheck and to address routine health maintenance.    This dictation has been generated using Dragon Dictation some phonetic errors may occur.         PLAN   ED Disposition Condition    Discharge Stable            Final  diagnoses:  [S61.452A, W54.0XXA] Dog bite of left hand, initial encounter (Primary)  [Z86.79] History of hypertension           Sylvain Joshua MD  05/27/25 3294

## 2025-05-28 NOTE — ED NOTES
First contact with pt.  Pt presents to ED via POV with family for concerns of swelling to the left hand after trimming pt dogs nails.  Pt with abrasion between 4th and 5th digit of left hand and puncture wound to the posterior left hand.  Swelling and redness noted to the left hand. No other concerns voiced at this time.  NAD noted.

## 2025-07-11 ENCOUNTER — OFFICE VISIT (OUTPATIENT)
Dept: PODIATRY | Facility: CLINIC | Age: 30
End: 2025-07-11
Payer: COMMERCIAL

## 2025-07-11 VITALS
RESPIRATION RATE: 16 BRPM | BODY MASS INDEX: 31.65 KG/M2 | HEART RATE: 89 BPM | WEIGHT: 172 LBS | SYSTOLIC BLOOD PRESSURE: 143 MMHG | DIASTOLIC BLOOD PRESSURE: 90 MMHG | HEIGHT: 62 IN

## 2025-07-11 DIAGNOSIS — M79.672 BILATERAL FOOT PAIN: ICD-10-CM

## 2025-07-11 DIAGNOSIS — M79.672 INFLAMMATORY HEEL PAIN, LEFT: ICD-10-CM

## 2025-07-11 DIAGNOSIS — M72.2 PLANTAR FASCIITIS, BILATERAL: Primary | ICD-10-CM

## 2025-07-11 DIAGNOSIS — M79.671 BILATERAL FOOT PAIN: ICD-10-CM

## 2025-07-11 DIAGNOSIS — G57.92 NEURITIS OF LEFT FOOT: ICD-10-CM

## 2025-07-11 DIAGNOSIS — G57.91 NEURITIS OF RIGHT FOOT: ICD-10-CM

## 2025-07-11 PROCEDURE — 99999 PR PBB SHADOW E&M-EST. PATIENT-LVL III: CPT | Mod: PBBFAC,,, | Performed by: PODIATRIST

## 2025-07-11 RX ORDER — BETAMETHASONE SODIUM PHOSPHATE AND BETAMETHASONE ACETATE 3; 3 MG/ML; MG/ML
18 INJECTION, SUSPENSION INTRA-ARTICULAR; INTRALESIONAL; INTRAMUSCULAR; SOFT TISSUE
Status: COMPLETED | OUTPATIENT
Start: 2025-07-11 | End: 2025-07-11

## 2025-07-11 RX ADMIN — BETAMETHASONE SODIUM PHOSPHATE AND BETAMETHASONE ACETATE 18 MG: 3; 3 INJECTION, SUSPENSION INTRA-ARTICULAR; INTRALESIONAL; INTRAMUSCULAR; SOFT TISSUE at 10:07

## 2025-07-28 NOTE — PROGRESS NOTES
"Subjective:       Patient ID: Kalin Sidhu IV is a 30 y.o. male.    Chief Complaint: Foot Pain (Bottom Bilateral Foot)  Patient presents with complaint bilateral foot pain left greater than right. Has been present on the bottom of his feet for "a long time." Has gotten worse over last few months, patient states it is now painful every day.  He has been walking on the side of his foot to avoid full pressure on the bottom of the heel. Increased pain upon first steps.  Wears steel-toed boots all day, progresses throughout the day. Does wear good tennis shoes outside of work. Pain level 5/10    Past Medical History:   Diagnosis Date    Hypertension     Nailbed laceration, finger, initial encounter 05/13/2023    Open displaced fracture of distal phalanx of right index finger 05/13/2023     Past Surgical History:   Procedure Laterality Date    ANKLE FRACTURE SURGERY  9/2014    Rt ankle    APPLICATION OF SPLINT Right 5/13/2023    Procedure: APPLICATION, SPLINT;  Surgeon: Jimmie Flannery MD;  Location: [facility] OR;  Service: Orthopedics;  Laterality: Right;    IRRIGATION AND DEBRIDEMENT OF UPPER EXTREMITY Right 5/13/2023    Procedure: IRRIGATION AND DEBRIDEMENT, index finger;  Surgeon: Jimmie Flannery MD;  Location: [facility] OR;  Service: Orthopedics;  Laterality: Right;    PERCUTANEOUS PINNING OF FINGER Right 5/13/2023    Procedure: PINNING, FINGER, open distal phalanx fracture- right index finger;  Surgeon: Jimmie Flannery MD;  Location: [facility] OR;  Service: Orthopedics;  Laterality: Right;    REPAIR OF LACERATION Right 5/13/2023    Procedure: REPAIR, LACERATION- right index finger;  Surgeon: Jimmie Flannery MD;  Location: [facility] OR;  Service: Orthopedics;  Laterality: Right;    REPAIR OF NAIL BED  5/13/2023    Procedure: REPAIR, NAIL BED- right index finger;  Surgeon: Jimmie Flannery MD;  Location: [facility] OR;  Service: Orthopedics;;     Family History   Problem Relation Name Age of Onset    No " "Known Problems Mother      No Known Problems Father      Collagen disease Neg Hx       Social History     Socioeconomic History    Marital status: Single   Tobacco Use    Smoking status: Never    Smokeless tobacco: Current     Types: Snuff    Tobacco comments:     Dip tabacco   Substance and Sexual Activity    Alcohol use: No     Comment: rarely    Drug use: No     Social Drivers of Health     Financial Resource Strain: Low Risk  (11/16/2024)    Overall Financial Resource Strain (CARDIA)     Difficulty of Paying Living Expenses: Not hard at all   Food Insecurity: No Food Insecurity (11/16/2024)    Hunger Vital Sign     Worried About Running Out of Food in the Last Year: Never true     Ran Out of Food in the Last Year: Never true   Physical Activity: Sufficiently Active (11/16/2024)    Exercise Vital Sign     Days of Exercise per Week: 4 days     Minutes of Exercise per Session: 150+ min   Stress: No Stress Concern Present (11/16/2024)    [employer] - Occupational Stress Questionnaire     Feeling of Stress : Not at all   Housing Stability: Unknown (11/16/2024)    Housing Stability Vital Sign     Unable to Pay for Housing in the Last Year: No       Current Medications[1]  Review of patient's allergies indicates:  No Known Allergies    Review of Systems   Musculoskeletal:  Positive for arthralgias and gait problem.   All other systems reviewed and are negative.      Objective:      Vitals:    07/11/25 0934   BP: (!) 143/90   Pulse: 89   Resp: 16   Weight: 78 kg (172 lb)   Height: 5' 2" (1.575 m)     Physical Exam  Vitals and nursing note reviewed.   Constitutional:       General: He is not in acute distress.     Appearance: Normal appearance.   Cardiovascular:      Pulses:           Dorsalis pedis pulses are 2+ on the right side and 2+ on the left side.        Posterior tibial pulses are 2+ on the right side and 2+ on the left side.   Musculoskeletal:         General: " Swelling and tenderness present.      Right foot: Decreased range of motion (decreased AJ DF bilateral).      Left foot: Decreased range of motion.      Comments: Pain plantar fascial insertion calcaneus, medial tubercle, medial band with some mild medial plantar neuritis left greater than right foot.  Plantar left heel very tight edematous and inflamed   Feet:      Right foot:      Skin integrity: Skin integrity normal.      Left foot:      Skin integrity: Skin integrity normal.   Skin:     Capillary Refill: Capillary refill takes less than 2 seconds.   Neurological:      General: No focal deficit present.      Mental Status: He is alert.   Psychiatric:         Thought Content: Thought content normal.                              Assessment:       1. Plantar fasciitis, bilateral    2. Neuritis of right foot    3. Bilateral foot pain    4. Neuritis of left foot    5. Inflammatory heel pain, left        Plan:           18 MG BETAMETHASONE IM LEFT HIP      Reviewed plantar fasciitis at length with patient. Explained this is a strain/sprain of the supportive band through the arch on the bottom of the foot and needs to be supported properly along with treatment for inflammation.   Explained since he has had symptoms for a long time this condition is most likely chronic   Discussed likely pain starting at the heel and now that it has progressed to pain every day through the bottom of both feet he most likely has some inflammation of the nerves along the bottom of both feet  Explained it is common to roll to the outside to avoid full pressure on the heels throughout her work day with prolonged walking and standing  Explained bracing of the plantar fascia through appropriate arch support is crucial. Advised arch support needs to be full length, firm, supportive and replaces existing insoles in shoes.   Adjust to wearing arch support comfortably in tennis shoe outside of work then start transferring form work boot to work  shoe so he has support at all times of weight-bearing  Explained it is important to wear arch support in tennis shoe outside of work, absolutely no flat shoes or walking in sock or bare feet  Reviewed stretching before getting out of bed in the morning, shoe with arch support at bedside.  Stretch a few times throughout the day and each evening as well, continue this same regimen of stretching daily.  It is recommended even once this has completely resolved he continue arch support at home and work along with daily stretching to prevent recurrence  Advised patient treatment needs to be utilized for inflammation and it needs to be aggressive due to length of time pain has been present  We discussed IM steroid/cortisone injection, effectiveness in decreasing inflammation, potential side effects and length of time injection may be beneficial  In combination utilize over-the-counter Advil Aleve or ibuprofen as directed twice daily with meals  Instructed patient on use of ice/cool therapy and frequency this should be performed, at least 3 times a day    Patient was in understanding and agreement with treatment plan.  I counseled the patient on their conditions, implications and medical management.  Instructed patient to contact the office with any changes, questions, concerns, worsening of symptoms.   Total face to face time 30 minutes, exam, assessment, treatment, discussion, additional time for review of chart prior to and following appointment and visit documentation, consultation and coordination of care.    Follow up 2 weeks      This note was created using M*Modal voice recognition software that occasionally misinterpreted phrases or words.           [1]  Current Outpatient Medications   Medication Sig Dispense Refill    losartan (COZAAR) 25 MG tablet Take 1 tablet (25 mg total) by mouth once daily. 90 tablet 3    amoxicillin-clavulanate 875-125mg (AUGMENTIN) 875-125 mg per tablet Take 1 tablet by mouth 2 (two)  times daily. 14 tablet 0     No current facility-administered medications for this visit.

## 2025-08-04 ENCOUNTER — PATIENT MESSAGE (OUTPATIENT)
Dept: ADMINISTRATIVE | Facility: HOSPITAL | Age: 30
End: 2025-08-04
Payer: COMMERCIAL